# Patient Record
Sex: FEMALE | Race: ASIAN | NOT HISPANIC OR LATINO | URBAN - METROPOLITAN AREA
[De-identification: names, ages, dates, MRNs, and addresses within clinical notes are randomized per-mention and may not be internally consistent; named-entity substitution may affect disease eponyms.]

---

## 2023-08-08 ENCOUNTER — INPATIENT (INPATIENT)
Facility: HOSPITAL | Age: 88
LOS: 2 days | Discharge: HOME HEALTH | DRG: 291 | End: 2023-08-11
Attending: STUDENT IN AN ORGANIZED HEALTH CARE EDUCATION/TRAINING PROGRAM | Admitting: STUDENT IN AN ORGANIZED HEALTH CARE EDUCATION/TRAINING PROGRAM
Payer: MEDICARE

## 2023-08-08 VITALS
WEIGHT: 106.04 LBS | TEMPERATURE: 98 F | OXYGEN SATURATION: 92 % | HEART RATE: 116 BPM | RESPIRATION RATE: 30 BRPM | DIASTOLIC BLOOD PRESSURE: 105 MMHG | SYSTOLIC BLOOD PRESSURE: 161 MMHG

## 2023-08-08 DIAGNOSIS — I50.9 HEART FAILURE, UNSPECIFIED: ICD-10-CM

## 2023-08-08 DIAGNOSIS — Z95.2 PRESENCE OF PROSTHETIC HEART VALVE: Chronic | ICD-10-CM

## 2023-08-08 DIAGNOSIS — Z95.0 PRESENCE OF CARDIAC PACEMAKER: ICD-10-CM

## 2023-08-08 DIAGNOSIS — Z95.0 PRESENCE OF CARDIAC PACEMAKER: Chronic | ICD-10-CM

## 2023-08-08 DIAGNOSIS — Z78.9 OTHER SPECIFIED HEALTH STATUS: ICD-10-CM

## 2023-08-08 DIAGNOSIS — Z96.649 PRESENCE OF UNSPECIFIED ARTIFICIAL HIP JOINT: Chronic | ICD-10-CM

## 2023-08-08 DIAGNOSIS — I10 ESSENTIAL (PRIMARY) HYPERTENSION: ICD-10-CM

## 2023-08-08 DIAGNOSIS — I48.0 PAROXYSMAL ATRIAL FIBRILLATION: ICD-10-CM

## 2023-08-08 DIAGNOSIS — Z86.79 PERSONAL HISTORY OF OTHER DISEASES OF THE CIRCULATORY SYSTEM: ICD-10-CM

## 2023-08-08 LAB
ALBUMIN SERPL ELPH-MCNC: 4 G/DL — SIGNIFICANT CHANGE UP (ref 3.3–5)
ALP SERPL-CCNC: 140 U/L — HIGH (ref 40–120)
ALT FLD-CCNC: 82 U/L — HIGH (ref 10–45)
ANION GAP SERPL CALC-SCNC: 11 MMOL/L — SIGNIFICANT CHANGE UP (ref 5–17)
APTT BLD: 41.3 SEC — HIGH (ref 24.5–35.6)
AST SERPL-CCNC: 100 U/L — HIGH (ref 10–40)
BASOPHILS # BLD AUTO: 0.05 K/UL — SIGNIFICANT CHANGE UP (ref 0–0.2)
BASOPHILS NFR BLD AUTO: 0.8 % — SIGNIFICANT CHANGE UP (ref 0–2)
BILIRUB SERPL-MCNC: 0.8 MG/DL — SIGNIFICANT CHANGE UP (ref 0.2–1.2)
BUN SERPL-MCNC: 22 MG/DL — SIGNIFICANT CHANGE UP (ref 7–23)
CALCIUM SERPL-MCNC: 9.2 MG/DL — SIGNIFICANT CHANGE UP (ref 8.4–10.5)
CHLORIDE SERPL-SCNC: 92 MMOL/L — LOW (ref 96–108)
CO2 SERPL-SCNC: 23 MMOL/L — SIGNIFICANT CHANGE UP (ref 22–31)
CREAT SERPL-MCNC: 0.99 MG/DL — SIGNIFICANT CHANGE UP (ref 0.5–1.3)
EGFR: 53 ML/MIN/1.73M2 — LOW
EOSINOPHIL # BLD AUTO: 0.09 K/UL — SIGNIFICANT CHANGE UP (ref 0–0.5)
EOSINOPHIL NFR BLD AUTO: 1.4 % — SIGNIFICANT CHANGE UP (ref 0–6)
GLUCOSE SERPL-MCNC: 153 MG/DL — HIGH (ref 70–99)
HCT VFR BLD CALC: 37.9 % — SIGNIFICANT CHANGE UP (ref 34.5–45)
HGB BLD-MCNC: 12.7 G/DL — SIGNIFICANT CHANGE UP (ref 11.5–15.5)
IMM GRANULOCYTES NFR BLD AUTO: 0.2 % — SIGNIFICANT CHANGE UP (ref 0–0.9)
INR BLD: 1.32 — HIGH (ref 0.85–1.18)
LYMPHOCYTES # BLD AUTO: 1.3 K/UL — SIGNIFICANT CHANGE UP (ref 1–3.3)
LYMPHOCYTES # BLD AUTO: 19.5 % — SIGNIFICANT CHANGE UP (ref 13–44)
MAGNESIUM SERPL-MCNC: 1.8 MG/DL — SIGNIFICANT CHANGE UP (ref 1.6–2.6)
MCHC RBC-ENTMCNC: 30.5 PG — SIGNIFICANT CHANGE UP (ref 27–34)
MCHC RBC-ENTMCNC: 33.5 GM/DL — SIGNIFICANT CHANGE UP (ref 32–36)
MCV RBC AUTO: 90.9 FL — SIGNIFICANT CHANGE UP (ref 80–100)
MONOCYTES # BLD AUTO: 0.68 K/UL — SIGNIFICANT CHANGE UP (ref 0–0.9)
MONOCYTES NFR BLD AUTO: 10.2 % — SIGNIFICANT CHANGE UP (ref 2–14)
NEUTROPHILS # BLD AUTO: 4.53 K/UL — SIGNIFICANT CHANGE UP (ref 1.8–7.4)
NEUTROPHILS NFR BLD AUTO: 67.9 % — SIGNIFICANT CHANGE UP (ref 43–77)
NRBC # BLD: 0 /100 WBCS — SIGNIFICANT CHANGE UP (ref 0–0)
NT-PROBNP SERPL-SCNC: 9310 PG/ML — HIGH (ref 0–300)
PLATELET # BLD AUTO: 147 K/UL — LOW (ref 150–400)
POTASSIUM SERPL-MCNC: 4.4 MMOL/L — SIGNIFICANT CHANGE UP (ref 3.5–5.3)
POTASSIUM SERPL-SCNC: 4.4 MMOL/L — SIGNIFICANT CHANGE UP (ref 3.5–5.3)
PROT SERPL-MCNC: 7 G/DL — SIGNIFICANT CHANGE UP (ref 6–8.3)
PROTHROM AB SERPL-ACNC: 14.9 SEC — HIGH (ref 9.5–13)
RBC # BLD: 4.17 M/UL — SIGNIFICANT CHANGE UP (ref 3.8–5.2)
RBC # FLD: 13.3 % — SIGNIFICANT CHANGE UP (ref 10.3–14.5)
SODIUM SERPL-SCNC: 126 MMOL/L — LOW (ref 135–145)
TROPONIN T, HIGH SENSITIVITY RESULT: 27 NG/L — SIGNIFICANT CHANGE UP (ref 0–51)
WBC # BLD: 6.66 K/UL — SIGNIFICANT CHANGE UP (ref 3.8–10.5)
WBC # FLD AUTO: 6.66 K/UL — SIGNIFICANT CHANGE UP (ref 3.8–10.5)

## 2023-08-08 PROCEDURE — 71045 X-RAY EXAM CHEST 1 VIEW: CPT | Mod: 26

## 2023-08-08 PROCEDURE — 99285 EMERGENCY DEPT VISIT HI MDM: CPT

## 2023-08-08 RX ORDER — SACUBITRIL AND VALSARTAN 24; 26 MG/1; MG/1
1 TABLET, FILM COATED ORAL
Refills: 0 | Status: DISCONTINUED | OUTPATIENT
Start: 2023-08-08 | End: 2023-08-09

## 2023-08-08 RX ORDER — METOPROLOL TARTRATE 50 MG
25 TABLET ORAL DAILY
Refills: 0 | Status: DISCONTINUED | OUTPATIENT
Start: 2023-08-08 | End: 2023-08-09

## 2023-08-08 RX ORDER — FUROSEMIDE 40 MG
40 TABLET ORAL ONCE
Refills: 0 | Status: COMPLETED | OUTPATIENT
Start: 2023-08-08 | End: 2023-08-08

## 2023-08-08 RX ORDER — APIXABAN 2.5 MG/1
2.5 TABLET, FILM COATED ORAL EVERY 12 HOURS
Refills: 0 | Status: DISCONTINUED | OUTPATIENT
Start: 2023-08-08 | End: 2023-08-11

## 2023-08-08 RX ORDER — IPRATROPIUM BROMIDE 0.2 MG/ML
500 SOLUTION, NON-ORAL INHALATION EVERY 6 HOURS
Refills: 0 | Status: DISCONTINUED | OUTPATIENT
Start: 2023-08-08 | End: 2023-08-11

## 2023-08-08 RX ORDER — FUROSEMIDE 40 MG
40 TABLET ORAL
Refills: 0 | Status: DISCONTINUED | OUTPATIENT
Start: 2023-08-08 | End: 2023-08-10

## 2023-08-08 RX ADMIN — Medication 25 MILLIGRAM(S): at 22:41

## 2023-08-08 RX ADMIN — SACUBITRIL AND VALSARTAN 1 TABLET(S): 24; 26 TABLET, FILM COATED ORAL at 22:41

## 2023-08-08 RX ADMIN — Medication 40 MILLIGRAM(S): at 20:56

## 2023-08-08 NOTE — ED ADULT NURSE NOTE - NSFALLRISKINTERV_ED_ALL_ED

## 2023-08-08 NOTE — H&P ADULT - ASSESSMENT
89 y/o Cantonese speaking female, ambulates with walker, has HHA 5 days a week 6hrs a day with PMHx of HTN, pAtrial Fibrillation (on Eliquis 2.5mg), recently diagnosed with CHF (EF unknown), hx of PPM (2yrs ago replaced @[facility]), and hx of valve repair/replacement 15yrs ago @[facility] (daughter does not recall which valve) presents to [facility] ER this evening, 8/8/23, accompanied by daughter, with PEREZ, b/l UE LE swelling X2 .

## 2023-08-08 NOTE — H&P ADULT - REASON FOR ADMISSION
Zyloprim     Last Written Prescription Date:  6-25-18  Last Fill Quantity: 90,   # refills: 0  Last Office Visit : 6-26-18  Future Office visit:  12-17-18    Routing refill request to provider for review/approval because:  Drug failed the [facility], [facility] or [facility] refill protocol.  Abnormal lab   6-26-18  Creatinine 1.51 (H)     Lab Test  03/22/18   1229   WBC  11.8*   RBC  4.04*   HGB  13.0*   HCT  37.5*   PLT  193     90 day rx pending      
Acute on Chronic CHF Exacerbation (EF unknown)

## 2023-08-08 NOTE — H&P ADULT - NSHPADDITIONALINFOADULT_GEN_ALL_CORE
Attending Attestation:  Patient seen and examined at bedside on 8/9. Please see attending addendum on that progress note.

## 2023-08-08 NOTE — ED PROVIDER NOTE - NSICDXNOPASTSURGICALHX_GEN_ALL_ED
<-- Click to add NO significant Past Surgical History REVIEW OF SYSTEMS:    CONSTITUTIONAL: No fever, weight loss, chills, shakes, or fatigue  EYES: No eye pain, visual disturbances, or discharge  ENMT:  No difficulty hearing, tinnitus, vertigo; No sinus or throat pain  NECK: No pain or stiffness  RESPIRATORY: No cough, wheezing, hemoptysis, or shortness of breath  CARDIOVASCULAR: No chest pain, dyspnea, palpitations, dizziness, syncope, paroxysmal nocturnal dyspnea, orthopnea, or arm or leg swelling  GASTROINTESTINAL: No abdominal  or epigastric pain, nausea, vomiting, hematemesis, diarrhea, constipation, melena or bright red blood.  GENITOURINARY: No dysuria, nocturia, hematuria, or urinary incontinence  NEUROLOGICAL: No headaches, memory loss, slurred speech, limb weakness, loss of strength, numbness, or tremors  SKIN: No itching, burning, rashes, or lesions   MUSCULOSKELETAL: No joint pain or swelling, muscle, back, or extremity pain  PSYCHIATRIC: No depression, anxiety, or difficulty sleeping

## 2023-08-08 NOTE — ED PROVIDER NOTE - CLINICAL SUMMARY MEDICAL DECISION MAKING FREE TEXT BOX
acute chf. unclear if new diagnosis from history but recently started on lasix 40mg. took 2 doses today. no chest pain to suggest acs. no fever to suggest infection. labs/cxr/ekg ordered. bnp 9000+. cxr with congestion/ cardiomegaly. discussed with cards fellow, requested additional lasix 40mg iv. admit to tele for further management

## 2023-08-08 NOTE — ED PROVIDER NOTE - WR INTERPRETATION 1
CXR - No pneumothorax, No opacities, No free air. congestion, cardiomegaly, sternal wires, pacemaker

## 2023-08-08 NOTE — H&P ADULT - HISTORY OF PRESENT ILLNESS
A&O X3  Full Code   Daughter Ileana Sapp, reliable source, lives with mother, contributed to history A&O X3  Full Code   Daughter Ileana Sapp, reliable source, lives with mother, contributed to history  Pt. refuses   service     89 y/o Cantonese speaking female, ambulates with walker, has HHA 5 days a week 6hrs a day with PMHx of HTN, pAtrial Fibrillation (on Eliquis 2.5mg), recently diagnosed with CHF (EF unknown), hx of PPM (2yrs ago replaced @[facility]), and hx of valve repair/replacement 15yrs ago @[facility] (daughter does not recall which valve) presents to [facility] ER this evening, 8/8/23, accompanied by daughter, with PEREZ, b/l UE LE swelling X2 .    In speaking with daughter, she reports her mother has been experiencing worsening PEREZ for one month and was recently diagnosed with CHF, prescribed Lasix 40 daily and Entresto 24-26mg bid.    Daughter states despite taking her medication her symptoms have worsened in the last two days and her  mother's Cardiologist recommended her to go to [facility] ER for further evaluation.     In ER ECG reveals Atrial Flutter  bpm with non specific ST-T wave changes, Troponin T Sensitivity neg X1 27,  BNP 9310, Na 126, Alk Phos 140, , ALT 82 Blood Glucose 153.  CXR AP reveals pulmonary congestion (follow up official report)  VSS Temp 96.8F HR Atrial Fib 104, /107   O2 2L NC 95%.    Patient is admitted to [facility] for acute on chronic CHF exacerbation, EF unknown.  Continue Core Measures, diuresis Lasix 40mg IV bid,  TTE in AM.

## 2023-08-08 NOTE — H&P ADULT - NSICDXPASTMEDICALHX_GEN_ALL_CORE_FT
PAST MEDICAL HISTORY:  Acute CHF     Atrial fibrillation     Cardiac pacemaker     HTN (hypertension)

## 2023-08-08 NOTE — H&P ADULT - PROBLEM SELECTOR PLAN 1
Recently diagnosed with CHF prescribed Lasix 40mg daily; Entresto 24-26mg bid (EF unknown)  BNP 9310  Lasix 40mg IV bid  on home Metoprolol XL 50mg daily; continue Metoprolol XL25mg daily   core measure (daily weight and strict I&O q4hrs)  TTE in AM  discuss further cardiac workup  GDMT

## 2023-08-08 NOTE — ED ADULT NURSE NOTE - CHPI ED NUR SYMPTOMS NEG
no back pain/no chest pain/no chills/no congestion/no diaphoresis/no dizziness/no fever/no nausea/no syncope

## 2023-08-08 NOTE — PATIENT PROFILE ADULT - FALL HARM RISK - HARM RISK INTERVENTIONS

## 2023-08-08 NOTE — H&P ADULT - NSHPLABSRESULTS_GEN_ALL_CORE
12.7   6.66  )-----------( 147      ( 08 Aug 2023 19:22 )             37.9       08-08    126<L>  |  92<L>  |  22  ----------------------------<  153<H>  4.4   |  23  |  0.99    Ca    9.2      08 Aug 2023 19:22  Mg     1.8     08-08    TPro  7.0  /  Alb  4.0  /  TBili  0.8  /  DBili  x   /  AST  100<H>  /  ALT  82<H>  /  AlkPhos  140<H>  08-08      PT/INR - ( 08 Aug 2023 19:22 )   PT: 14.9 sec;   INR: 1.32          PTT - ( 08 Aug 2023 19:22 )  PTT:41.3 sec          Urinalysis Basic - ( 08 Aug 2023 19:22 )    Color: x / Appearance: x / SG: x / pH: x  Gluc: 153 mg/dL / Ketone: x  / Bili: x / Urobili: x   Blood: x / Protein: x / Nitrite: x   Leuk Esterase: x / RBC: x / WBC x   Sq Epi: x / Non Sq Epi: x / Bacteria: x        EKG: Atrial Flutter HR 123bpm with non specific ST-T wave changs Isotretinoin Pregnancy And Lactation Text: This medication is Pregnancy Category X and is considered extremely dangerous during pregnancy. It is unknown if it is excreted in breast milk.

## 2023-08-08 NOTE — ED PROVIDER NOTE - OBJECTIVE STATEMENT
sincere via daughter per pt request: history of afib on eliquis/ metoprolol, htn, pacemaker, recently diagnosed with chf, started lasix 40mg daily last week. Took for a few days, then stopped, started again today (took twice). Notes increased swelling of arms/legs, trouble breathing past week. Trouble sleeping last night due to symptoms. Denies chest pain, fever. Sent by her cardiologist for further evaluation.

## 2023-08-08 NOTE — H&P ADULT - NSHPREVIEWOFSYSTEMS_GEN_ALL_CORE
GENERAL, CONSTITUTIONAL : denies recent weight loss, fever, chills  EYES, VISION: denies changes in vision   EARS, NOSE, THROAT: denies hearing loss  HEART, CARDIOVASCULAR: denies chest pain, arrhythmia, palpitations, SOB,  LE edema, claudication  RESPIRATORY: Denies cough, SOB, wheezing, PND, orthopnea  GASTROINTESTINAL: Denies abdominal pain, heartburn, bloody stool, dark tarry stool  GENITOURINARY: Denies frequent urination, urgency  MUSCULOSKELETAL denies joint pain or swelling, restricted motion, musculoskeletal pain.   SKIN & INTEGUMENTARY Denies rashes, sores, blisters, blisters, growths.  NEUROLOGICAL: Denies numbness or tingling sensations, sensation loss, burning.   PSYCHIATRIC: Denies nervousness, anxiety, depression  ENDOCRINE Denies heat or cold intolerance, excessive thirst  HEMATOLOGIC/LYMPHATIC: Denies abnormal bleeding, bleeding of any kind GENERAL, CONSTITUTIONAL : denies recent weight loss, fever, chills  EYES, VISION: denies changes in vision   EARS, NOSE, THROAT: denies hearing loss  HEART, CARDIOVASCULAR: denies chest pain, arrhythmia, palpitations,   RESPIRATORY: admits   SOB, crackles, orthopnea, denies wheezing, PND,   GASTROINTESTINAL: Denies abdominal pain, heartburn, bloody stool, dark tarry stool  GENITOURINARY: Denies frequent urination, urgency  MUSCULOSKELETAL admits to  joint pain  restricted motion, musculoskeletal pain.   SKIN & INTEGUMENTARY Denies rashes, sores, blisters, blisters, growths.  NEUROLOGICAL: Denies numbness or tingling sensations, sensation loss, burning.   PSYCHIATRIC: Denies nervousness, anxiety, depression  ENDOCRINE Denies heat or cold intolerance, excessive thirst  HEMATOLOGIC/LYMPHATIC: Denies abnormal bleeding, bleeding of any kind

## 2023-08-08 NOTE — H&P ADULT - PROBLEM SELECTOR PLAN 2
ECG Atrial Flutter HR 123bpm with non specific ST-T wave changes  now  Atrial Fibrillation rate control   continue Metoprolol XL 25mg daily (on Metoprolol XL 50mg daily)  continue Eliquis 2.5mg bid

## 2023-08-09 DIAGNOSIS — I34.0 NONRHEUMATIC MITRAL (VALVE) INSUFFICIENCY: ICD-10-CM

## 2023-08-09 DIAGNOSIS — R74.01 ELEVATION OF LEVELS OF LIVER TRANSAMINASE LEVELS: ICD-10-CM

## 2023-08-09 DIAGNOSIS — I50.21 ACUTE SYSTOLIC (CONGESTIVE) HEART FAILURE: ICD-10-CM

## 2023-08-09 LAB
A1C WITH ESTIMATED AVERAGE GLUCOSE RESULT: 6 % — HIGH (ref 4–5.6)
ALBUMIN SERPL ELPH-MCNC: 3.4 G/DL — SIGNIFICANT CHANGE UP (ref 3.3–5)
ALP SERPL-CCNC: 118 U/L — SIGNIFICANT CHANGE UP (ref 40–120)
ALT FLD-CCNC: 61 U/L — HIGH (ref 10–45)
ANION GAP SERPL CALC-SCNC: 11 MMOL/L — SIGNIFICANT CHANGE UP (ref 5–17)
APTT BLD: 41.8 SEC — HIGH (ref 24.5–35.6)
AST SERPL-CCNC: 58 U/L — HIGH (ref 10–40)
BASOPHILS # BLD AUTO: 0.05 K/UL — SIGNIFICANT CHANGE UP (ref 0–0.2)
BASOPHILS NFR BLD AUTO: 0.8 % — SIGNIFICANT CHANGE UP (ref 0–2)
BILIRUB DIRECT SERPL-MCNC: 0.3 MG/DL — SIGNIFICANT CHANGE UP (ref 0–0.3)
BILIRUB INDIRECT FLD-MCNC: 0.7 MG/DL — SIGNIFICANT CHANGE UP (ref 0.2–1)
BILIRUB SERPL-MCNC: 0.9 MG/DL — SIGNIFICANT CHANGE UP (ref 0.2–1.2)
BUN SERPL-MCNC: 23 MG/DL — SIGNIFICANT CHANGE UP (ref 7–23)
CALCIUM SERPL-MCNC: 8.4 MG/DL — SIGNIFICANT CHANGE UP (ref 8.4–10.5)
CHLORIDE SERPL-SCNC: 93 MMOL/L — LOW (ref 96–108)
CHOLEST SERPL-MCNC: 140 MG/DL — SIGNIFICANT CHANGE UP
CO2 SERPL-SCNC: 26 MMOL/L — SIGNIFICANT CHANGE UP (ref 22–31)
CREAT ?TM UR-MCNC: 13 MG/DL — SIGNIFICANT CHANGE UP
CREAT SERPL-MCNC: 0.95 MG/DL — SIGNIFICANT CHANGE UP (ref 0.5–1.3)
EGFR: 56 ML/MIN/1.73M2 — LOW
EOSINOPHIL # BLD AUTO: 0.13 K/UL — SIGNIFICANT CHANGE UP (ref 0–0.5)
EOSINOPHIL NFR BLD AUTO: 2 % — SIGNIFICANT CHANGE UP (ref 0–6)
ESTIMATED AVERAGE GLUCOSE: 126 MG/DL — HIGH (ref 68–114)
GLUCOSE SERPL-MCNC: 108 MG/DL — HIGH (ref 70–99)
HCT VFR BLD CALC: 35.9 % — SIGNIFICANT CHANGE UP (ref 34.5–45)
HDLC SERPL-MCNC: 64 MG/DL — SIGNIFICANT CHANGE UP
HGB BLD-MCNC: 11.8 G/DL — SIGNIFICANT CHANGE UP (ref 11.5–15.5)
IMM GRANULOCYTES NFR BLD AUTO: 0.3 % — SIGNIFICANT CHANGE UP (ref 0–0.9)
INR BLD: 1.34 — HIGH (ref 0.85–1.18)
LACTATE SERPL-SCNC: 1.2 MMOL/L — SIGNIFICANT CHANGE UP (ref 0.5–2)
LIPID PNL WITH DIRECT LDL SERPL: 65 MG/DL — SIGNIFICANT CHANGE UP
LYMPHOCYTES # BLD AUTO: 0.99 K/UL — LOW (ref 1–3.3)
LYMPHOCYTES # BLD AUTO: 15.3 % — SIGNIFICANT CHANGE UP (ref 13–44)
MAGNESIUM SERPL-MCNC: 1.8 MG/DL — SIGNIFICANT CHANGE UP (ref 1.6–2.6)
MCHC RBC-ENTMCNC: 30 PG — SIGNIFICANT CHANGE UP (ref 27–34)
MCHC RBC-ENTMCNC: 32.9 GM/DL — SIGNIFICANT CHANGE UP (ref 32–36)
MCV RBC AUTO: 91.3 FL — SIGNIFICANT CHANGE UP (ref 80–100)
MONOCYTES # BLD AUTO: 0.75 K/UL — SIGNIFICANT CHANGE UP (ref 0–0.9)
MONOCYTES NFR BLD AUTO: 11.6 % — SIGNIFICANT CHANGE UP (ref 2–14)
NEUTROPHILS # BLD AUTO: 4.54 K/UL — SIGNIFICANT CHANGE UP (ref 1.8–7.4)
NEUTROPHILS NFR BLD AUTO: 70 % — SIGNIFICANT CHANGE UP (ref 43–77)
NON HDL CHOLESTEROL: 76 MG/DL — SIGNIFICANT CHANGE UP
NRBC # BLD: 0 /100 WBCS — SIGNIFICANT CHANGE UP (ref 0–0)
OSMOLALITY UR: 255 MOSM/KG — LOW (ref 300–900)
PHOSPHATE SERPL-MCNC: 4.9 MG/DL — HIGH (ref 2.5–4.5)
PLATELET # BLD AUTO: 119 K/UL — LOW (ref 150–400)
POTASSIUM SERPL-MCNC: 4.1 MMOL/L — SIGNIFICANT CHANGE UP (ref 3.5–5.3)
POTASSIUM SERPL-SCNC: 4.1 MMOL/L — SIGNIFICANT CHANGE UP (ref 3.5–5.3)
PROT SERPL-MCNC: 6.2 G/DL — SIGNIFICANT CHANGE UP (ref 6–8.3)
PROTHROM AB SERPL-ACNC: 15.1 SEC — HIGH (ref 9.5–13)
RBC # BLD: 3.93 M/UL — SIGNIFICANT CHANGE UP (ref 3.8–5.2)
RBC # FLD: 13.3 % — SIGNIFICANT CHANGE UP (ref 10.3–14.5)
SODIUM SERPL-SCNC: 130 MMOL/L — LOW (ref 135–145)
SODIUM UR-SCNC: 90 MMOL/L — SIGNIFICANT CHANGE UP
TRIGL SERPL-MCNC: 54 MG/DL — SIGNIFICANT CHANGE UP
TSH SERPL-MCNC: 1.25 UIU/ML — SIGNIFICANT CHANGE UP (ref 0.27–4.2)
UUN UR-MCNC: 132 MG/DL — SIGNIFICANT CHANGE UP
WBC # BLD: 6.48 K/UL — SIGNIFICANT CHANGE UP (ref 3.8–10.5)
WBC # FLD AUTO: 6.48 K/UL — SIGNIFICANT CHANGE UP (ref 3.8–10.5)

## 2023-08-09 PROCEDURE — 99223 1ST HOSP IP/OBS HIGH 75: CPT | Mod: 25

## 2023-08-09 PROCEDURE — 99222 1ST HOSP IP/OBS MODERATE 55: CPT

## 2023-08-09 PROCEDURE — 93280 PM DEVICE PROGR EVAL DUAL: CPT | Mod: 26

## 2023-08-09 PROCEDURE — 93306 TTE W/DOPPLER COMPLETE: CPT | Mod: 26

## 2023-08-09 PROCEDURE — 99223 1ST HOSP IP/OBS HIGH 75: CPT

## 2023-08-09 RX ORDER — METOPROLOL TARTRATE 50 MG
25 TABLET ORAL ONCE
Refills: 0 | Status: COMPLETED | OUTPATIENT
Start: 2023-08-09 | End: 2023-08-09

## 2023-08-09 RX ORDER — SENNA PLUS 8.6 MG/1
2 TABLET ORAL AT BEDTIME
Refills: 0 | Status: DISCONTINUED | OUTPATIENT
Start: 2023-08-09 | End: 2023-08-11

## 2023-08-09 RX ORDER — MAGNESIUM OXIDE 400 MG ORAL TABLET 241.3 MG
400 TABLET ORAL ONCE
Refills: 0 | Status: COMPLETED | OUTPATIENT
Start: 2023-08-09 | End: 2023-08-09

## 2023-08-09 RX ORDER — SACUBITRIL AND VALSARTAN 24; 26 MG/1; MG/1
1 TABLET, FILM COATED ORAL
Refills: 0 | Status: DISCONTINUED | OUTPATIENT
Start: 2023-08-09 | End: 2023-08-11

## 2023-08-09 RX ORDER — METOPROLOL TARTRATE 50 MG
50 TABLET ORAL DAILY
Refills: 0 | Status: DISCONTINUED | OUTPATIENT
Start: 2023-08-10 | End: 2023-08-11

## 2023-08-09 RX ORDER — POLYETHYLENE GLYCOL 3350 17 G/17G
17 POWDER, FOR SOLUTION ORAL DAILY
Refills: 0 | Status: DISCONTINUED | OUTPATIENT
Start: 2023-08-09 | End: 2023-08-11

## 2023-08-09 RX ADMIN — Medication 500 MICROGRAM(S): at 22:07

## 2023-08-09 RX ADMIN — Medication 25 MILLIGRAM(S): at 05:34

## 2023-08-09 RX ADMIN — APIXABAN 2.5 MILLIGRAM(S): 2.5 TABLET, FILM COATED ORAL at 05:34

## 2023-08-09 RX ADMIN — Medication 25 MILLIGRAM(S): at 17:10

## 2023-08-09 RX ADMIN — MAGNESIUM OXIDE 400 MG ORAL TABLET 400 MILLIGRAM(S): 241.3 TABLET ORAL at 05:34

## 2023-08-09 RX ADMIN — SENNA PLUS 2 TABLET(S): 8.6 TABLET ORAL at 22:02

## 2023-08-09 RX ADMIN — Medication 40 MILLIGRAM(S): at 14:04

## 2023-08-09 RX ADMIN — POLYETHYLENE GLYCOL 3350 17 GRAM(S): 17 POWDER, FOR SOLUTION ORAL at 14:04

## 2023-08-09 RX ADMIN — APIXABAN 2.5 MILLIGRAM(S): 2.5 TABLET, FILM COATED ORAL at 17:10

## 2023-08-09 RX ADMIN — SACUBITRIL AND VALSARTAN 1 TABLET(S): 24; 26 TABLET, FILM COATED ORAL at 17:09

## 2023-08-09 RX ADMIN — MAGNESIUM OXIDE 400 MG ORAL TABLET 400 MILLIGRAM(S): 241.3 TABLET ORAL at 14:04

## 2023-08-09 RX ADMIN — SACUBITRIL AND VALSARTAN 1 TABLET(S): 24; 26 TABLET, FILM COATED ORAL at 05:34

## 2023-08-09 RX ADMIN — Medication 40 MILLIGRAM(S): at 05:35

## 2023-08-09 NOTE — CONSULT NOTE ADULT - CONSULT REASON
Group Therapy Note    Date: 10/8/2020    Group Start Time: 1000  Group End Time: 6358  Group Topic: Psychoeducation    GIAN Diaz, CTRS        Group Therapy Note    Attendees: 8/16      Pt did not attend RT skills group d/t resting in room despite staff invitation to attend. 1:1 talk time offered as alternative to group session, pt declined.
co-management
AF/ pacemaker check

## 2023-08-09 NOTE — PROGRESS NOTE ADULT - PROBLEM SELECTOR PLAN 2
ECG Atrial Flutter HR 123bpm with non specific ST-T wave changes  now  Atrial Fibrillation rate control   continue Metoprolol XL 25mg daily (on Metoprolol XL 50mg daily)  continue Eliquis 2.5mg bid Presented in Afib 120s, currently Afib 80-120s on tele.  -EP interrogated [employer] PPM, noting increased AF burden. Most recent episode ongoing since 7/29. Overall AF burden since last interrogation (July 2022) 13%.   -c/w Toprol 25mg daily, will add Lopressor 25mg x1 this evening. Resume home dose Toprol 50mg qd in AM  -c/w Eliquis 2.5mg BID (age 89, Wt 53kg), initiated 7/27 by Dr Lopez  -Consider possible MISAEL/DCCV prior to discharge. Family request to be discussed w/ Dr Lopez Presented in Afib 120s, currently Afib 80-120s on tele.  -EP interrogated [employer] PPM, noting increased AF burden. Most recent episode ongoing since 7/29. Overall AF burden since last interrogation (July 2022) 13%.   -c/w Toprol 25mg daily, will add Lopressor 25mg x1 this evening. Resume home dose Toprol 50mg qd in AM  -c/w Eliquis 2.5mg BID (age 89, Wt 53kg), initiated by Dr Lopez on 7/27  -Consider possible MISAEL/DCCV prior to discharge. Family request to be discussed w/ Dr Lopez

## 2023-08-09 NOTE — PROGRESS NOTE ADULT - PROBLEM SELECTOR PLAN 5
Please call patient's Cardiologist, Dr. 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

## 2023-08-09 NOTE — PROGRESS NOTE ADULT - ASSESSMENT
89 y/o Cantonese speaking female, ambulates with walker, has HHA 5 days a week 6hrs a day with PMHx of HTN, pAtrial Fibrillation (on Eliquis 2.5mg), recently diagnosed with CHF (EF unknown), hx of PPM (2yrs ago replaced @[facility]), and hx of valve repair/replacement 15yrs ago @[facility] (daughter does not recall which valve) presents to [facility] ER this evening, 8/8/23, accompanied by daughter, with PEREZ, b/l UE LE swelling X2 .   89 y/o Cantonese speaking female, ambulates with walker, has HHA 5 days a week 6hrs a day with PMHx of HTN, pAtrial Fibrillation (on Eliquis 2.5mg), recently diagnosed with CHF (EF unknown), hx of PPM (2yrs ago replaced @[facility]), and hx of valve repair/replacement 15yrs ago @[facility] (daughter does not recall which valve) presents to [facility] ER this evening, 8/8/23, accompanied by daughter, with PEREZ, b/l UE LE swelling X2 . 89 y/o Cantonese speaking female, ambulates with walker, has HHA 5 days a week 6hr/day, with PMHx of HTN, paroxysmal Afib (recently started on Eliquis 2.5mg on 7/27), recently diagnosed with HFrEF (EF 40%), [employer] PPM (gen change 2021 @[facility]), bioprosthetic AVR 2009 @[facility], presents accompanied by daughter c/o PEREZ and b/l UE/LE swelling x 1 week. Admitted to cardiac tele for acute on chronic HFrEF exacerbation, currently on IV diuresis. EP following, PPM interrogation revealed persistent Afib since 7/29.  89 y/o Cantonese speaking female, ambulates with walker, has HHA 5 days a week 6hr/day, with PMHx of HTN, paroxysmal Afib (recently started on Eliquis 2.5mg on 7/27), recently diagnosed with HFrEF (EF 40%), [employer] PPM (s/p gen change 2022 @[facility]), bioprosthetic AVR 2009 @[facility], presents accompanied by daughter c/o PEREZ and b/l UE/LE swelling x 1 week. Admitted to cardiac tele for acute on chronic HFrEF exacerbation, currently on IV diuresis. EP following, PPM interrogation revealed persistent Afib since 7/29, pending possible MISAEL/DCCV.

## 2023-08-09 NOTE — CONSULT NOTE ADULT - SUBJECTIVE AND OBJECTIVE BOX
HPI:    89 year old cantonese speaking female with history of HTN, history of Medtronic dual chamber pacemaker (unclear initial indication) initially implanted in 2012 s/p gen change 2022, history of aortic bioprosthetic         PAST MEDICAL & SURGICAL HISTORY:  Atrial fibrillation  HTN (hypertension)  Acute CHF  Cardiac pacemaker  Heart valve replaced  S/P hip replacement  Cardiac pacemaker              Social History:no smoking, no drugs, no algohol    pertinent home medications:    Inpatient Medications:   apixaban 2.5 milliGRAM(s) Oral every 12 hours  furosemide   Injectable 40 milliGRAM(s) IV Push two times a day  ipratropium    for Nebulization 500 MICROGram(s) Nebulizer every 6 hours PRN  LORazepam     Tablet 0.5 milliGRAM(s) Oral two times a day PRN  metoprolol succinate ER 25 milliGRAM(s) Oral daily  polyethylene glycol 3350 17 Gram(s) Oral daily  sacubitril 24 mG/valsartan 26 mG 1 Tablet(s) Oral two times a day  senna 2 Tablet(s) Oral at bedtime      Allergies: No Known Allergies      ROS:   CONSTITUTIONAL: No fever, weight loss + fatigue  EYES: Pt denies  RESPIRATORY: No cough, wheezing, chills or hemoptysis; No Shortness of Breath  CARDIOVASCULAR: see HPI  GASTROINTESTINAL: Pt denies  NEUROLOGICAL: Pt denies  SKIN: Pt denies   PSYCHIATRIC: Pt denies  HEME/LYMPH: Pt denies    PHYSICAL:  T(C): 36.4 (08-09-23 @ 08:39), Max: 36.8 (08-08-23 @ 18:17)  HR: 97 (08-09-23 @ 13:30) (76 - 116)  BP: 148/95 (08-09-23 @ 13:30) (132/83 - 169/107)  RR: 17 (08-09-23 @ 08:39) (17 - 30)  SpO2: 97% (08-09-23 @ 13:30) (92% - 100%)  Wt(kg): --  Appearance: No acute distress, well developed  Eyes: normal appearing conjunctiva, pupils and eyelids  Cardiovascular: Normal S1 S2, No JVD, No murmurs, No edema  Respiratory: Lungs clear to auscultation	bilaterally.  No wheeze, rhonchi, rales noted  Gastrointestinal:  Soft, NT/ND 	  Neurologic:  No deficit noted  Psych: A&Ox3, normal mood/affect  Musculoskeletal: normal gait  Skin: no rash noted, normal color and pigmentation.        LABS:                        11.8   6.48  )-----------( 119      ( 09 Aug 2023 05:30 )             35.9     08-09    130<L>  |  93<L>  |  23  ----------------------------<  108<H>  4.1   |  26  |  0.95    Ca    8.4      09 Aug 2023 05:30  Phos  4.9     08-09  Mg     1.8     08-09    TPro  6.2  /  Alb  3.4  /  TBili  0.9  /  DBili  0.3  /  AST  58<H>  /  ALT  61<H>  /  AlkPhos  118  08-09    PT/INR - ( 09 Aug 2023 05:30 )   PT: 15.1 sec;   INR: 1.34          PTT - ( 09 Aug 2023 05:30 )  PTT:41.8 sec  TSH  Troponin    EKG:    Telemetry:    ECHO:    Prior EP procedures:    Cath / stress / Cardiac CTa:    Assessment Plan:         HPI:    89 year old Cantonese speaking female with history of HTN, history of Medtronic dual chamber pacemaker (unclear initial indication) initially implanted in 2012 s/p gen change 2022, history of aortic bioprosthetic valve implanted in 2009, who presents with leg swelling and dypsnea on exertion for the past week. Pt saw cardiologist Dr. Lopez on 8/4, pt and daughter refused to go to hospital at that time, now presents with worsening respiratory symptoms and ADHF.     ECHO with EF 35-40%, biatrial enlargement, severe MR.     Pt has Medtronic pacemaker, interrogation as follows                                                                                Electrophysiology Device Interrogation     Device model: Medtronic Aydee Dual Chamber Pacemaker			                          Functioning Mode: DDD     Underlying Rhythm:      Pacemaker dependency:     Battery status:     Interrogating parameters:   				RA			RV			LV  Sense:                                                              Threshold:                                                                                                                    Pacing Impedance:                                                                                                          Shock Impedance:                                                                                                                     Events/Alert:      Parameter change: 	     Assessment:             PAST MEDICAL & SURGICAL HISTORY:  Atrial fibrillation  HTN (hypertension)  Acute CHF  Cardiac pacemaker  Heart valve replaced  S/P hip replacement  Cardiac pacemaker              Social History:no smoking, no drugs, no algohol    pertinent home medications:    Inpatient Medications:   apixaban 2.5 milliGRAM(s) Oral every 12 hours  furosemide   Injectable 40 milliGRAM(s) IV Push two times a day  ipratropium    for Nebulization 500 MICROGram(s) Nebulizer every 6 hours PRN  LORazepam     Tablet 0.5 milliGRAM(s) Oral two times a day PRN  metoprolol succinate ER 25 milliGRAM(s) Oral daily  polyethylene glycol 3350 17 Gram(s) Oral daily  sacubitril 24 mG/valsartan 26 mG 1 Tablet(s) Oral two times a day  senna 2 Tablet(s) Oral at bedtime      Allergies: No Known Allergies      ROS:   CONSTITUTIONAL: No fever, weight loss + fatigue  EYES: Pt denies  RESPIRATORY: No cough, wheezing, chills or hemoptysis; No Shortness of Breath  CARDIOVASCULAR: see HPI  GASTROINTESTINAL: Pt denies  NEUROLOGICAL: Pt denies  SKIN: Pt denies   PSYCHIATRIC: Pt denies  HEME/LYMPH: Pt denies    PHYSICAL:  T(C): 36.4 (08-09-23 @ 08:39), Max: 36.8 (08-08-23 @ 18:17)  HR: 97 (08-09-23 @ 13:30) (76 - 116)  BP: 148/95 (08-09-23 @ 13:30) (132/83 - 169/107)  RR: 17 (08-09-23 @ 08:39) (17 - 30)  SpO2: 97% (08-09-23 @ 13:30) (92% - 100%)  Wt(kg): --  Appearance: No acute distress, well developed  Eyes: normal appearing conjunctiva, pupils and eyelids  Cardiovascular: Normal S1 S2, No JVD, No murmurs, No edema  Respiratory: Lungs clear to auscultation	bilaterally.  No wheeze, rhonchi, rales noted  Gastrointestinal:  Soft, NT/ND 	  Neurologic:  No deficit noted  Psych: A&Ox3, normal mood/affect  Musculoskeletal: normal gait  Skin: no rash noted, normal color and pigmentation.        LABS:                        11.8   6.48  )-----------( 119      ( 09 Aug 2023 05:30 )             35.9     08-09    130<L>  |  93<L>  |  23  ----------------------------<  108<H>  4.1   |  26  |  0.95    Ca    8.4      09 Aug 2023 05:30  Phos  4.9     08-09  Mg     1.8     08-09    TPro  6.2  /  Alb  3.4  /  TBili  0.9  /  DBili  0.3  /  AST  58<H>  /  ALT  61<H>  /  AlkPhos  118  08-09    PT/INR - ( 09 Aug 2023 05:30 )   PT: 15.1 sec;   INR: 1.34          PTT - ( 09 Aug 2023 05:30 )  PTT:41.8 sec  TSH  Troponin    EKG:    Telemetry:    ECHO:    Prior EP procedures:    Cath / stress / Cardiac CTa:    Assessment Plan:         HPI:    89 year old Cantonese speaking female with history of HTN, history of Medtronic dual chamber pacemaker (unclear initial indication) initially implanted in 2012 s/p gen change 2022, history of aortic bioprosthetic valve implanted in 2009, who presents with leg swelling and dypsnea on exertion for the past week. Pt saw cardiologist Dr. Lopez on 8/4, pt and daughter refused to go to hospital at that time, now presents with worsening respiratory symptoms and ADHF.     ECHO with EF 35-40%, biatrial enlargement, severe MR.     Pt has Medtronic pacemaker, interrogation as follows                                                                                Electrophysiology Device Interrogation     Device model: Medtronic Aydee Dual Chamber Pacemaker			                          Functioning Mode: DDD 60 to 110     Underlying Rhythm:  AF with RVR    Pacemaker dependency: not pacemaker dependent. AP 22%  <1%    Battery status: 13 years remaining    Interrogating parameters:   				RA			RV		  Sense:                                     0.6mV                         12.8mV  Threshold:                unable to assess (in AF)           unable to assess (VR >105bpm) but threshold trends look stable.                                                                                    Pacing Impedance:                   475 ohms                      627 ohms                                                                  Events/Alert:  patient with increased AF burden. Most recent episode ongoing since July 29th. Overall AF burden since last interrogation (July 2022) 13%.     Parameter change: 	 none      PAST MEDICAL & SURGICAL HISTORY:  Atrial fibrillation  HTN (hypertension)  Acute CHF  Cardiac pacemaker  Heart valve replaced  S/P hip replacement  Cardiac pacemaker    Social History: no smoking, no drugs, no ETOH    pertinent home medications:    Inpatient Medications:   apixaban 2.5 milliGRAM(s) Oral every 12 hours  furosemide   Injectable 40 milliGRAM(s) IV Push two times a day  ipratropium    for Nebulization 500 MICROGram(s) Nebulizer every 6 hours PRN  LORazepam     Tablet 0.5 milliGRAM(s) Oral two times a day PRN  metoprolol succinate ER 25 milliGRAM(s) Oral daily  polyethylene glycol 3350 17 Gram(s) Oral daily  sacubitril 24 mG/valsartan 26 mG 1 Tablet(s) Oral two times a day  senna 2 Tablet(s) Oral at bedtime      Allergies: No Known Allergies      ROS:   CONSTITUTIONAL: No fever, weight loss + fatigue  EYES: Pt denies  RESPIRATORY: No cough, wheezing, chills or hemoptysis; No Shortness of Breath  CARDIOVASCULAR: see HPI  GASTROINTESTINAL: Pt denies  NEUROLOGICAL: Pt denies  SKIN: Pt denies   PSYCHIATRIC: Pt denies  HEME/LYMPH: Pt denies    PHYSICAL:  T(C): 36.4 (08-09-23 @ 08:39), Max: 36.8 (08-08-23 @ 18:17)  HR: 97 (08-09-23 @ 13:30) (76 - 116)  BP: 148/95 (08-09-23 @ 13:30) (132/83 - 169/107)  RR: 17 (08-09-23 @ 08:39) (17 - 30)  SpO2: 97% (08-09-23 @ 13:30) (92% - 100%)  Appearance: No acute distress, well developed  Eyes: normal appearing conjunctiva, pupils and eyelids  Cardiovascular: irregularly irregular   Respiratory: Lungs clear to auscultation	bilaterally.  No wheeze, rhonchi, rales noted  Gastrointestinal:  Soft, NT/ND 	  Neurologic:  No deficit noted  Psych: A&Ox3, normal mood/affect  Musculoskeletal: normal gait  Skin: no rash noted, normal color and pigmentation.        LABS:                        11.8   6.48  )-----------( 119      ( 09 Aug 2023 05:30 )             35.9     08-09    130<L>  |  93<L>  |  23  ----------------------------<  108<H>  4.1   |  26  |  0.95    Ca    8.4      09 Aug 2023 05:30  Phos  4.9     08-09  Mg     1.8     08-09    TPro  6.2  /  Alb  3.4  /  TBili  0.9  /  DBili  0.3  /  AST  58<H>  /  ALT  61<H>  /  AlkPhos  118  08-09    PT/INR - ( 09 Aug 2023 05:30 )   PT: 15.1 sec;   INR: 1.34          PTT - ( 09 Aug 2023 05:30 )  PTT:41.8 sec    Assessment Plan:    89 year old Cantonese speaking female with history of HTN, history of Medtronic dual chamber pacemaker (unclear initial indication) initially implanted in 2012 s/p gen change 2022, history of aortic bioprosthetic valve implanted in 2009, who presents with leg swelling and dypsnea on exertion for the past week. Pt saw cardiologist Dr. Lopez on 8/4, pt and daughter refused to go to hospital at that time, now presents with worsening respiratory symptoms and ADHF. Pt been in persistent AF since July 29th but was having long paroxysms before that as well. EF noted to be 35-40%, pt also noted to have severe MR.    -plan for DCCV prior to discharge. Pt would need a CT scan or MISAEL to r/o SEB thrombus prior to DCCV  -consider workup/mgmt for severe MR (pt may not maintain NSR long term if patient having severe MR)  -likely plan for initiation of amiodarone after DCCV

## 2023-08-09 NOTE — PROGRESS NOTE ADULT - PROBLEM SELECTOR PLAN 4
EP interrogate PPM Hypertensive -160s/90-100s  -Increased Entresto to 49/51mg BID  -Increase back to home dose Metoprolol 50mg qd Hypertensive -160s/90-100s  -Increased Entresto to 49/51mg BID  -Increase back to home dose Metoprolol 50mg qd    #Hyponatremia  Na 126 on arrival. Likely hypervolemic hyponatremia  - Medicine team following  - Improving Na 130 (8/9)   - 1L fluid restriction   - F/u Serum osm, TSH, urine osm, urine sodium

## 2023-08-09 NOTE — PHYSICAL THERAPY INITIAL EVALUATION ADULT - GAIT DEVIATIONS NOTED, PT EVAL
Unsteady gait, min impulsive while ambulating, pt declining further distance secondary to stating she is too hungry to participate from being NPO for a test/decreased tena/decreased velocity of limb motion/decreased step length/decreased weight-shifting ability

## 2023-08-09 NOTE — PROGRESS NOTE ADULT - PROBLEM SELECTOR PROBLEM 1
Acute exacerbation of CHF (congestive heart failure) Acute HFrEF (heart failure with reduced ejection fraction)

## 2023-08-09 NOTE — PROGRESS NOTE ADULT - SUBJECTIVE AND OBJECTIVE BOX
CARDIOLOGY NP PROGRESS NOTE    Subjective:   Remainder ROS otherwise negative.    Overnight Events:     TELEMETRY:    EKG:      VITAL SIGNS:  T(C): 36.4 (08-09-23 @ 08:39), Max: 36.8 (08-08-23 @ 18:17)  HR: 112 (08-09-23 @ 08:39) (76 - 116)  BP: 162/98 (08-09-23 @ 08:39) (132/83 - 169/107)  RR: 17 (08-09-23 @ 08:39) (17 - 30)  SpO2: 97% (08-09-23 @ 08:39) (92% - 100%)  Wt(kg): --    I&O's Summary    08 Aug 2023 07:01  -  09 Aug 2023 07:00  --------------------------------------------------------  IN: 0 mL / OUT: 1200 mL / NET: -1200 mL    09 Aug 2023 07:01  -  09 Aug 2023 12:32  --------------------------------------------------------  IN: 240 mL / OUT: 650 mL / NET: -410 mL          PHYSICAL EXAM:    General: A/ox 3, No acute Distress  Neck: Supple, NO JVD  Cardiac: S1 S2, No M/R/G  Pulmonary: CTAB, Breathing unlabored, No Rhonchi/Rales/Wheezing  Abdomen: Soft, Non -tender, +BS x 4 quads  Extremities: No Rashes, No edema  Neuro: A/o x 3, No focal deficits          LABS:                          11.8   6.48  )-----------( 119      ( 09 Aug 2023 05:30 )             35.9                              08-09    130<L>  |  93<L>  |  23  ----------------------------<  108<H>  4.1   |  26  |  0.95    Ca    8.4      09 Aug 2023 05:30  Phos  4.9     08-09  Mg     1.8     08-09    TPro  6.2  /  Alb  3.4  /  TBili  0.9  /  DBili  0.3  /  AST  58<H>  /  ALT  61<H>  /  AlkPhos  118  08-09    LIVER FUNCTIONS - ( 09 Aug 2023 05:30 )  Alb: 3.4 g/dL / Pro: 6.2 g/dL / ALK PHOS: 118 U/L / ALT: 61 U/L / AST: 58 U/L / GGT: x                                 PT/INR - ( 09 Aug 2023 05:30 )   PT: 15.1 sec;   INR: 1.34          PTT - ( 09 Aug 2023 05:30 )  PTT:41.8 sec  CAPILLARY BLOOD GLUCOSE                Allergies:  No Known Allergies    MEDICATIONS  (STANDING):  apixaban 2.5 milliGRAM(s) Oral every 12 hours  furosemide   Injectable 40 milliGRAM(s) IV Push two times a day  magnesium oxide 400 milliGRAM(s) Oral once  metoprolol succinate ER 25 milliGRAM(s) Oral daily  polyethylene glycol 3350 17 Gram(s) Oral daily  sacubitril 24 mG/valsartan 26 mG 1 Tablet(s) Oral two times a day  senna 2 Tablet(s) Oral at bedtime    MEDICATIONS  (PRN):  ipratropium    for Nebulization 500 MICROGram(s) Nebulizer every 6 hours PRN Shortness of Breath and/or Wheezing  LORazepam     Tablet 0.5 milliGRAM(s) Oral two times a day PRN Anxiety        DIAGNOSTIC TESTS:        CARDIOLOGY NP PROGRESS NOTE    Subjective: Pt seen and examined at bedside. Reports feeling improvement in SOB w/ diuresis. Denies chest pain, lightheadedness, dizziness, palpitations, fever, chills.  Remainder ROS otherwise negative.    Overnight Events: net negative 1.2L over last 24hrs    TELEMETRY: Afib 80-120s, occasional PVCs      VITAL SIGNS:  T(C): 36.4 (08-09-23 @ 08:39), Max: 36.8 (08-08-23 @ 18:17)  HR: 112 (08-09-23 @ 08:39) (76 - 116)  BP: 162/98 (08-09-23 @ 08:39) (132/83 - 169/107)  RR: 17 (08-09-23 @ 08:39) (17 - 30)  SpO2: 97% (08-09-23 @ 08:39) (92% - 100%)  Wt(kg): --    I&O's Summary    08 Aug 2023 07:01  -  09 Aug 2023 07:00  --------------------------------------------------------  IN: 0 mL / OUT: 1200 mL / NET: -1200 mL    09 Aug 2023 07:01  -  09 Aug 2023 12:32  --------------------------------------------------------  IN: 240 mL / OUT: 650 mL / NET: -410 mL          PHYSICAL EXAM:    General: A/ox 3, No acute Distress  Neck: Supple, + JVD  Cardiac: S1 S2, grade IV/VI systolic murmur at apex  Pulmonary: Lungs w/ bibasilar crackles, Breathing unlabored on 2L NC, No Rhonchi/Wheezing  Abdomen: Soft, Non -tender, +BS x 4 quads  Extremities: No Rashes, Pedro 1+ LE edema  Neuro: A/o x 3, No focal deficits          LABS:                          11.8   6.48  )-----------( 119      ( 09 Aug 2023 05:30 )             35.9                              08-09    130<L>  |  93<L>  |  23  ----------------------------<  108<H>  4.1   |  26  |  0.95    Ca    8.4      09 Aug 2023 05:30  Phos  4.9     08-09  Mg     1.8     08-09    TPro  6.2  /  Alb  3.4  /  TBili  0.9  /  DBili  0.3  /  AST  58<H>  /  ALT  61<H>  /  AlkPhos  118  08-09    LIVER FUNCTIONS - ( 09 Aug 2023 05:30 )  Alb: 3.4 g/dL / Pro: 6.2 g/dL / ALK PHOS: 118 U/L / ALT: 61 U/L / AST: 58 U/L / GGT: x         PT/INR - ( 09 Aug 2023 05:30 )   PT: 15.1 sec;   INR: 1.34          PTT - ( 09 Aug 2023 05:30 )  PTT:41.8 sec  CAPILLARY BLOOD GLUCOSE                Allergies:  No Known Allergies    MEDICATIONS  (STANDING):  apixaban 2.5 milliGRAM(s) Oral every 12 hours  furosemide   Injectable 40 milliGRAM(s) IV Push two times a day  magnesium oxide 400 milliGRAM(s) Oral once  metoprolol succinate ER 25 milliGRAM(s) Oral daily  polyethylene glycol 3350 17 Gram(s) Oral daily  sacubitril 24 mG/valsartan 26 mG 1 Tablet(s) Oral two times a day  senna 2 Tablet(s) Oral at bedtime    MEDICATIONS  (PRN):  ipratropium    for Nebulization 500 MICROGram(s) Nebulizer every 6 hours PRN Shortness of Breath and/or Wheezing  LORazepam     Tablet 0.5 milliGRAM(s) Oral two times a day PRN Anxiety        DIAGNOSTIC TESTS:     < from: TTE Echo Complete w/o Contrast w/ Doppler (08.09.23 @ 11:31) >  CONCLUSIONS:     1. Moderately reduced left ventricular systolic function.   2. Mildly dilated left ventricular size.   3. Mildly dilated right ventricular size.   4. Reduced right ventricular systolic function.   5. Biatrial enlargement.   6. 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   7. Severe mitral regurgitation.   8. Moderate tricuspid regurgitation.   9. Moderate pulmonic regurgitation.  10. Pulmonary hypertension present, pulmonary artery systolic pressure is 46 mmHg.  11. No pericardial effusion.  12. Mildly dilated ascending aorta.  13. No prior echo is available for comparison.    < end of copied text >

## 2023-08-09 NOTE — PHYSICAL THERAPY INITIAL EVALUATION ADULT - ADDITIONAL COMMENTS
As per pt, PTA she required assistance with all functional mobility, ADLs, and IADLs. Pt has a home health aide 5d x 6h. Ambulates short distances with RW, has a shower tub with grab bars and shower chair.

## 2023-08-09 NOTE — PROGRESS NOTE ADULT - PROBLEM SELECTOR PLAN 3
Monitor BP  home medication Metoprolol XL 50mg daily; Entresto 24-26mg bid Echo w/ severe MR, mod TR/GA, bioprosthetic AVR apparent normal function.  -BP control w/ Entresto 49/51mg BID and Toprol  -c/w IV diuresis  -Consider SHD eval for possible Mitraclip if aligned w/ pt's GOC. Will discuss w/ outpt cardiologist Dr Lopez

## 2023-08-09 NOTE — PROGRESS NOTE ADULT - PROBLEM SELECTOR PLAN 1
Recently diagnosed with CHF prescribed Lasix 40mg daily; Entresto 24-26mg bid (EF unknown)  BNP 9310  Lasix 40mg IV bid  on home Metoprolol XL 50mg daily; continue Metoprolol XL25mg daily   core measure (daily weight and strict I&O q4hrs)  TTE in AM  discuss further cardiac workup  GDMT Recently diagnosed with HFrEF (EF 40%) by outpt cardiologist Dr Jerome Lopez 2 weeks ago, and started on Lasix 40mg daily, Entresto 24/26mg BID, Toprol 50mg qd.  -Etiology possibly 2/2 Afib RVR, severe MR, vs ischemic in nature  -BNP 9310, hs Trop T negative x1. AST//82 (improving)  -CXR w/ pulm vasc congestion  -EKG Afib 120s, Q waves V1-V3  -Outpt Echo 7/27/23: EF 40%, severely dilated LA, mildly dilated RA, mild conc LVH, G2DD, normal function AVR, mod-severe MR  -ECHO 8/9/23 @ [facility]: EF 35-40%, global hypokinesis, mildly dilated LV/RV, MATI, reduced RVSF, bioprosthetic AVR apparent normal function, severe MR, mod TR/AZ.  -c/w Lasix 40mg IV BID, net negative 1.6L so far  -Wean off O2 as tolerated, currently on 2L NC  -Increase home Entresto 24/26mg to 49/51mg BID  -c/w Toprol 25mg daily, will add Lopressor 25mg x1 this evening. Resume home dose Toprol 50mg qd in AM  -HF core measure, daily weight. strict I&Os

## 2023-08-09 NOTE — PHYSICAL THERAPY INITIAL EVALUATION ADULT - PERTINENT HX OF CURRENT PROBLEM, REHAB EVAL
89 y/o Cantonese speaking female presents to [facility] ER evening, 8/8/23, accompanied by daughter, with PEREZ, b/l UE LE swelling X2 . In speaking with daughter, she reports her mother has been experiencing worsening PEREZ for one month and was recently diagnosed with CHF, prescribed Lasix 40 daily and Entresto 24-26mg bid. Daughter states despite taking her medication her symptoms have worsened in the last two days and her mother's Cardiologist recommended her to go to [facility] ER for further evaluation.

## 2023-08-09 NOTE — CONSULT NOTE ADULT - ASSESSMENT
89 y/oF  Cantonese speaking PMHx of HTN, paroxysmal AFib ( on Eliquis 2.5mg), bioprosthetic AVR # NYP-Epi ( 2009, past AV peak gradient 1.7 in 2022), hx PPM ( 2012) and s/p generator change ( 7/2022 at [facility] by EP Dr. Donovan Wolff), developed PEREZ/leg swelling last Friday 8/4/23 however refused to go the hospital, Cardiologist  prescribed standing Lasix 40mg daily and started Entresto 24/26 bid  for CHF and advised daughter to take pt to the hospital if no improvement. Daughter brought pt to [facility] ED ( 8/8) , admitted for acute hypoxic respiratory failure ( required NC2L) 2/2 ADHF ( proBNP 9310), and hyponatremia ( SNa+ 126), and mild transaminitis. Pt was admitted to Cardiology service, and started on O2 via NC2L, and lasix 40mg iv bid.   Pt seen this am, resting in bed, no apparent distress, on NC2L, feels better but still has residual SOB.     # Acute hypoxic respiratory failure   # ADHF 2/2 acute systolic heart failure (EF 40% 1/2022)  # pAFib  # hx bio-AVR   # s/p PPM(2012)/generator change ( 7/2022)   - active care per cardiology service, Dr. Autumn Galvan (case discussed)  - O2 via NC 2L, titrate to keep SpO2>90%  - ipratropium    for Nebulization 500 MICROGram(s) Nebulizer every 6 hours PRN  - furosemide   Injectable 40 milliGRAM(s) IV Push two times a day ( 8/8-)   - monitor UO/primafit   - one liter fluid restriction   - keep K>4 and Mg>2  - GDMT:   metoprolol succinate ER 25 milliGRAM(s) Oral daily  sacubitril 24 mG/valsartan 26 mG 1 Tablet(s) Oral two times a day  - TTE ordered   - EP to interrogate PPM   -pAFib: c/w DOAC apixaban 2.5 milliGRAM(s) Oral every 12 hours    # Hyponatremia  - likely dilutional   - SNa+ 126(8/8)-> 130(8/9)   - one liter fluid restriction   - Serum osm, TSH, urine osm, urine sodium     # Constipation  - polyethylene glycol 3350 17 Gram(s) Oral daily    # Gait abnormality  - baseline WC assist  - PT eval/bedside PT   - fall precaution     # DVT ppx: DOAC     # code status: FULL CODE     # Contact:  [facility] Cardiology: Dr.David Lopez [phone]  PMD: Dr. Gaudencio Wolff [phone]     Ileana Sapp (daughter) [phone]    # Disposition: medically active, d/c plan likely later this week when euvolemic and hyponatremia deemed stable     # POC as d/w cardiology team Dr. Galvan/Hollie KABA , and referring Cardiologist Dr. Jerome Lopez and PMD Dr. Gaudencio Wolff

## 2023-08-09 NOTE — CONSULT NOTE ADULT - SUBJECTIVE AND OBJECTIVE BOX
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 Donovan Wolff), developed PEREZ/leg swelling last Friday 8/4/23 however refused to go the hospital, Cardiologist  prescribed standing Lasix 40mg daily and started Entresto 24/26 bid  for CHF and advised daughter to take pt to the hospital if no improvement. Daughter brought pt to [facility] ED ( 8/8) , admitted for acute hypoxic respiratory failure ( required NC2L) 2/2 ADHF ( proBNP 9310), and hyponatremia ( SNa+ 126), and mild transaminitis. Pt was admitted to Cardiology service, and started on O2 via NC2L, and lasix 40mg iv bid.   Pt seen this am, resting in bed, no apparent distress, on NC2L, feels better but still has residual SOB.     PAST MEDICAL & SURGICAL HISTORY:  Atrial fibrillation      HTN (hypertension)      Acute CHF      Cardiac pacemaker      Heart valve replaced      S/P hip replacement      Cardiac pacemaker        Home Medications:  Eliquis 2.5 mg oral tablet: 1 orally 2 times a day (08 Aug 2023 22:44)  Entresto 24 mg-26 mg oral tablet: 1 orally 2 times a day (08 Aug 2023 22:44)  Lasix 40 mg oral tablet: 1 orally once a day (08 Aug 2023 22:39)  LORazepam 1 mg oral tablet: 1 orally 2 times a day as needed for  anxiety (08 Aug 2023 22:44)  metoprolol succinate 50 mg oral tablet, extended release: 1 orally once a day (08 Aug 2023 22:44)    Allergies    No Known Allergies    Intolerances      FAMILY HISTORY:    Social History:  Cantonese speaking female, single, lives with daughter, denies Tobacco, EtOH and illicit drug use. (08 Aug 2023 22:04)      REVIEW OF SYSTEMS:  CONSTITUTIONAL: No fever, weight loss  EYES: No eye pain, or discharge  ENMT:  No tinnitus, vertigo  NECK: No pain or stiffness  RESPIRATORY: No cough, No dyspnea  CARDIOVASCULAR: No chest pain, or leg swelling  GASTROINTESTINAL: No abdominal pain. No diarrhea ;No melena or hematochezia.  GENITOURINARY: No dysuria, frequency, or hematuria  NEUROLOGICAL: No numbness, or tremors  SKIN: No itching, burning, rashes, or lesions   ENDOCRINE: No heat or cold intolerance;  MUSCULOSKELETAL: No joint pain or swelling;   PSYCHIATRIC: No mood swings, or difficulty sleeping  HEME/LYMPH: No easy bruising, or bleeding gums  ALLERGY AND IMMUNOLOGIC: No hives or eczema        CURRENT MEDICATIONS:   apixaban 2.5 milliGRAM(s) Oral every 12 hours  furosemide   Injectable 40 milliGRAM(s) IV Push two times a day  ipratropium    for Nebulization 500 MICROGram(s) Nebulizer every 6 hours PRN  LORazepam     Tablet 0.5 milliGRAM(s) Oral two times a day PRN  magnesium oxide 400 milliGRAM(s) Oral once  metoprolol succinate ER 25 milliGRAM(s) Oral daily  polyethylene glycol 3350 17 Gram(s) Oral daily  sacubitril 24 mG/valsartan 26 mG 1 Tablet(s) Oral two times a day  senna 2 Tablet(s) Oral at bedtime      VITAL SIGNS, INS/OUTS (last 24 hours):  Vital Signs Last 24 Hrs  T(C): 36.4 (09 Aug 2023 08:39), Max: 36.8 (08 Aug 2023 18:17)  T(F): 97.5 (09 Aug 2023 08:39), Max: 98.2 (08 Aug 2023 18:17)  HR: 112 (09 Aug 2023 08:39) (76 - 116)  BP: 162/98 (09 Aug 2023 08:39) (132/83 - 169/107)  BP(mean): --  RR: 17 (09 Aug 2023 08:39) (17 - 30)  SpO2: 97% (09 Aug 2023 08:39) (92% - 100%)    Parameters below as of 09 Aug 2023 08:39  Patient On (Oxygen Delivery Method): nasal cannula  O2 Flow (L/min): 2    I&O's Summary    08 Aug 2023 07:01  -  09 Aug 2023 07:00  --------------------------------------------------------  IN: 0 mL / OUT: 1200 mL / NET: -1200 mL    09 Aug 2023 07:01  -  09 Aug 2023 12:12  --------------------------------------------------------  IN: 240 mL / OUT: 650 mL / NET: -410 mL        PHYSICAL EXAM:  Gen: Reclining in bed at time of exam, appears stated age  HEENT: NCAT, MMM, clear OP  Neck: supple, trachea at midline  CV: RRR, +S1/S2  Pulm: adequate respiratory effort, no increase in work of breathing, minimal basilar crackles   Abd: soft, NTND  Skin: warm and dry,  Ext: WWP, trace preankle LE edema  Neuro: AOx3, no gross focal neurological deficits  Psych: affect and behavior appropriate, pleasant at time of interview    BASIC LABS:                        11.8   6.48  )-----------( 119      ( 09 Aug 2023 05:30 )             35.9     08-09    130<L>  |  93<L>  |  23  ----------------------------<  108<H>  4.1   |  26  |  0.95    Ca    8.4      09 Aug 2023 05:30  Phos  4.9     08-09  Mg     1.8     08-09    TPro  6.2  /  Alb  3.4  /  TBili  0.9  /  DBili  0.3  /  AST  58<H>  /  ALT  61<H>  /  AlkPhos  118  08-09    PT/INR - ( 09 Aug 2023 05:30 )   PT: 15.1 sec;   INR: 1.34          PTT - ( 09 Aug 2023 05:30 )  PTT:41.8 sec  Urinalysis Basic - ( 09 Aug 2023 05:30 )    Color: x / Appearance: x / SG: x / pH: x  Gluc: 108 mg/dL / Ketone: x  / Bili: x / Urobili: x   Blood: x / Protein: x / Nitrite: x   Leuk Esterase: x / RBC: x / WBC x   Sq Epi: x / Non Sq Epi: x / Bacteria: x      CAPILLARY BLOOD GLUCOSE          OTHER LABS:        MICRODATA:      IMAGING:    EKG:    #Diet -       #DVT PPx -

## 2023-08-10 ENCOUNTER — TRANSCRIPTION ENCOUNTER (OUTPATIENT)
Age: 88
End: 2023-08-10

## 2023-08-10 LAB
ALBUMIN SERPL ELPH-MCNC: 3.5 G/DL — SIGNIFICANT CHANGE UP (ref 3.3–5)
ALP SERPL-CCNC: 114 U/L — SIGNIFICANT CHANGE UP (ref 40–120)
ALT FLD-CCNC: 51 U/L — HIGH (ref 10–45)
ANION GAP SERPL CALC-SCNC: 9 MMOL/L — SIGNIFICANT CHANGE UP (ref 5–17)
AST SERPL-CCNC: 44 U/L — HIGH (ref 10–40)
BILIRUB SERPL-MCNC: 0.7 MG/DL — SIGNIFICANT CHANGE UP (ref 0.2–1.2)
BLD GP AB SCN SERPL QL: NEGATIVE — SIGNIFICANT CHANGE UP
BUN SERPL-MCNC: 32 MG/DL — HIGH (ref 7–23)
CALCIUM SERPL-MCNC: 9 MG/DL — SIGNIFICANT CHANGE UP (ref 8.4–10.5)
CHLORIDE SERPL-SCNC: 93 MMOL/L — LOW (ref 96–108)
CO2 SERPL-SCNC: 31 MMOL/L — SIGNIFICANT CHANGE UP (ref 22–31)
CREAT SERPL-MCNC: 1.18 MG/DL — SIGNIFICANT CHANGE UP (ref 0.5–1.3)
EGFR: 43 ML/MIN/1.73M2 — LOW
GLUCOSE SERPL-MCNC: 104 MG/DL — HIGH (ref 70–99)
HCT VFR BLD CALC: 37.7 % — SIGNIFICANT CHANGE UP (ref 34.5–45)
HGB BLD-MCNC: 12.5 G/DL — SIGNIFICANT CHANGE UP (ref 11.5–15.5)
MAGNESIUM SERPL-MCNC: 2 MG/DL — SIGNIFICANT CHANGE UP (ref 1.6–2.6)
MCHC RBC-ENTMCNC: 30.4 PG — SIGNIFICANT CHANGE UP (ref 27–34)
MCHC RBC-ENTMCNC: 33.2 GM/DL — SIGNIFICANT CHANGE UP (ref 32–36)
MCV RBC AUTO: 91.7 FL — SIGNIFICANT CHANGE UP (ref 80–100)
NRBC # BLD: 0 /100 WBCS — SIGNIFICANT CHANGE UP (ref 0–0)
OSMOLALITY SERPL: 287 MOSM/KG — SIGNIFICANT CHANGE UP (ref 280–301)
PLATELET # BLD AUTO: 130 K/UL — LOW (ref 150–400)
POTASSIUM SERPL-MCNC: 4.2 MMOL/L — SIGNIFICANT CHANGE UP (ref 3.5–5.3)
POTASSIUM SERPL-SCNC: 4.2 MMOL/L — SIGNIFICANT CHANGE UP (ref 3.5–5.3)
PROT SERPL-MCNC: 6.6 G/DL — SIGNIFICANT CHANGE UP (ref 6–8.3)
RBC # BLD: 4.11 M/UL — SIGNIFICANT CHANGE UP (ref 3.8–5.2)
RBC # FLD: 13.5 % — SIGNIFICANT CHANGE UP (ref 10.3–14.5)
RH IG SCN BLD-IMP: POSITIVE — SIGNIFICANT CHANGE UP
SODIUM SERPL-SCNC: 133 MMOL/L — LOW (ref 135–145)
WBC # BLD: 8.01 K/UL — SIGNIFICANT CHANGE UP (ref 3.8–10.5)
WBC # FLD AUTO: 8.01 K/UL — SIGNIFICANT CHANGE UP (ref 3.8–10.5)

## 2023-08-10 PROCEDURE — 99233 SBSQ HOSP IP/OBS HIGH 50: CPT

## 2023-08-10 PROCEDURE — 92960 CARDIOVERSION ELECTRIC EXT: CPT

## 2023-08-10 PROCEDURE — 99232 SBSQ HOSP IP/OBS MODERATE 35: CPT

## 2023-08-10 PROCEDURE — 93320 DOPPLER ECHO COMPLETE: CPT | Mod: 26

## 2023-08-10 PROCEDURE — 93312 ECHO TRANSESOPHAGEAL: CPT | Mod: 26

## 2023-08-10 PROCEDURE — 76377 3D RENDER W/INTRP POSTPROCES: CPT | Mod: 26

## 2023-08-10 RX ORDER — DIGOXIN 250 MCG
125 TABLET ORAL ONCE
Refills: 0 | Status: COMPLETED | OUTPATIENT
Start: 2023-08-11 | End: 2023-08-11

## 2023-08-10 RX ORDER — DIGOXIN 250 MCG
250 TABLET ORAL ONCE
Refills: 0 | Status: COMPLETED | OUTPATIENT
Start: 2023-08-10 | End: 2023-08-10

## 2023-08-10 RX ADMIN — POLYETHYLENE GLYCOL 3350 17 GRAM(S): 17 POWDER, FOR SOLUTION ORAL at 14:55

## 2023-08-10 RX ADMIN — Medication 250 MICROGRAM(S): at 14:55

## 2023-08-10 RX ADMIN — SACUBITRIL AND VALSARTAN 1 TABLET(S): 24; 26 TABLET, FILM COATED ORAL at 05:39

## 2023-08-10 RX ADMIN — Medication 40 MILLIGRAM(S): at 05:39

## 2023-08-10 RX ADMIN — SACUBITRIL AND VALSARTAN 1 TABLET(S): 24; 26 TABLET, FILM COATED ORAL at 18:15

## 2023-08-10 RX ADMIN — Medication 50 MILLIGRAM(S): at 05:39

## 2023-08-10 RX ADMIN — APIXABAN 2.5 MILLIGRAM(S): 2.5 TABLET, FILM COATED ORAL at 05:39

## 2023-08-10 RX ADMIN — APIXABAN 2.5 MILLIGRAM(S): 2.5 TABLET, FILM COATED ORAL at 18:15

## 2023-08-10 NOTE — PROGRESS NOTE ADULT - SUBJECTIVE AND OBJECTIVE BOX
CARDIOLOGY NP PROGRESS NOTE    Subjective: Pt seen and examined at bedside. Denies SOB, chest pain, lightheadedness, dizziness, palpitations, fever, chills. Patient endorses constipation. Remainder ROS otherwise negative.    Overnight Events: net negative 1.2L over last 24hrs    TELEMETRY: Afib 80-120s, occasional PVCs      VITAL SIGNS:  T(C): 36.4 (08-09-23 @ 08:39), Max: 36.8 (08-08-23 @ 18:17)  HR: 112 (08-09-23 @ 08:39) (76 - 116)  BP: 162/98 (08-09-23 @ 08:39) (132/83 - 169/107)  RR: 17 (08-09-23 @ 08:39) (17 - 30)  SpO2: 97% (08-09-23 @ 08:39) (92% - 100%)  Wt(kg): --    I&O's Summary    08 Aug 2023 07:01  -  09 Aug 2023 07:00  --------------------------------------------------------  IN: 0 mL / OUT: 1200 mL / NET: -1200 mL    09 Aug 2023 07:01  -  09 Aug 2023 12:32  --------------------------------------------------------  IN: 240 mL / OUT: 650 mL / NET: -410 mL          PHYSICAL EXAM:    General: A/ox 3, No acute Distress  Neck: Supple, + JVD  Cardiac: S1 S2, grade IV/VI systolic murmur at apex  Pulmonary: Lungs w/ bibasilar crackles, Breathing unlabored on 2L NC, No Rhonchi/Wheezing  Abdomen: Soft, Non -tender, +BS x 4 quads  Extremities: No Rashes, Pedro 1+ LE edema  Neuro: A/o x 3, No focal deficits          LABS:                          11.8   6.48  )-----------( 119      ( 09 Aug 2023 05:30 )             35.9                              08-09    130<L>  |  93<L>  |  23  ----------------------------<  108<H>  4.1   |  26  |  0.95    Ca    8.4      09 Aug 2023 05:30  Phos  4.9     08-09  Mg     1.8     08-09    TPro  6.2  /  Alb  3.4  /  TBili  0.9  /  DBili  0.3  /  AST  58<H>  /  ALT  61<H>  /  AlkPhos  118  08-09    LIVER FUNCTIONS - ( 09 Aug 2023 05:30 )  Alb: 3.4 g/dL / Pro: 6.2 g/dL / ALK PHOS: 118 U/L / ALT: 61 U/L / AST: 58 U/L / GGT: x         PT/INR - ( 09 Aug 2023 05:30 )   PT: 15.1 sec;   INR: 1.34          PTT - ( 09 Aug 2023 05:30 )  PTT:41.8 sec  CAPILLARY BLOOD GLUCOSE    Allergies:  No Known Allergies    MEDICATIONS  (STANDING):  apixaban 2.5 milliGRAM(s) Oral every 12 hours  furosemide   Injectable 40 milliGRAM(s) IV Push two times a day  magnesium oxide 400 milliGRAM(s) Oral once  metoprolol succinate ER 25 milliGRAM(s) Oral daily  polyethylene glycol 3350 17 Gram(s) Oral daily  sacubitril 24 mG/valsartan 26 mG 1 Tablet(s) Oral two times a day  senna 2 Tablet(s) Oral at bedtime    MEDICATIONS  (PRN):  ipratropium    for Nebulization 500 MICROGram(s) Nebulizer every 6 hours PRN Shortness of Breath and/or Wheezing  LORazepam     Tablet 0.5 milliGRAM(s) Oral two times a day PRN Anxiety        DIAGNOSTIC TESTS:     < from: TTE Echo Complete w/o Contrast w/ Doppler (08.09.23 @ 11:31) >  CONCLUSIONS:     1. Moderately reduced left ventricular systolic function.   2. Mildly dilated left ventricular size.   3. Mildly dilated right ventricular size.   4. Reduced right ventricular systolic function.   5. Biatrial enlargement.   6. 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   7. Severe mitral regurgitation.   8. Moderate tricuspid regurgitation.   9. Moderate pulmonic regurgitation.  10. Pulmonary hypertension present, pulmonary artery systolic pressure is 46 mmHg.  11. No pericardial effusion.  12. Mildly dilated ascending aorta.  13. No prior echo is available for comparison.    < end of copied text >     CARDIOLOGY NP PROGRESS NOTE    Subjective: Pt seen and examined at bedside. Denies SOB, chest pain, lightheadedness, dizziness, palpitations, fever, chills. Patient endorses constipation. Remainder ROS otherwise negative.    Overnight Events: net negative 1.8L over last 24hrs    TELEMETRY: Afib 80-120s, occasional PVCs      VITAL SIGNS:  T(C): 36.4 (08-09-23 @ 08:39), Max: 36.8 (08-08-23 @ 18:17)  HR: 112 (08-09-23 @ 08:39) (76 - 116)  BP: 162/98 (08-09-23 @ 08:39) (132/83 - 169/107)  RR: 17 (08-09-23 @ 08:39) (17 - 30)  SpO2: 97% (08-09-23 @ 08:39) (92% - 100%)  Wt(kg): --    I&O's Summary    08 Aug 2023 07:01  -  09 Aug 2023 07:00  --------------------------------------------------------  IN: 0 mL / OUT: 1200 mL / NET: -1200 mL    09 Aug 2023 07:01  -  09 Aug 2023 12:32  --------------------------------------------------------  IN: 240 mL / OUT: 650 mL / NET: -410 mL          PHYSICAL EXAM:    General: A/ox 3, No acute Distress  Neck: Supple, + JVD  Cardiac: S1 S2, grade IV/VI systolic murmur at apex  Pulmonary: Lungs w/ bibasilar crackles, Breathing unlabored on 2L NC, No Rhonchi/Wheezing  Abdomen: Soft, Non -tender, +BS x 4 quads  Extremities: No Rashes, Pedro 1+ LE edema  Neuro: A/o x 3, No focal deficits          LABS:                          11.8   6.48  )-----------( 119      ( 09 Aug 2023 05:30 )             35.9                              08-09    130<L>  |  93<L>  |  23  ----------------------------<  108<H>  4.1   |  26  |  0.95    Ca    8.4      09 Aug 2023 05:30  Phos  4.9     08-09  Mg     1.8     08-09    TPro  6.2  /  Alb  3.4  /  TBili  0.9  /  DBili  0.3  /  AST  58<H>  /  ALT  61<H>  /  AlkPhos  118  08-09    LIVER FUNCTIONS - ( 09 Aug 2023 05:30 )  Alb: 3.4 g/dL / Pro: 6.2 g/dL / ALK PHOS: 118 U/L / ALT: 61 U/L / AST: 58 U/L / GGT: x         PT/INR - ( 09 Aug 2023 05:30 )   PT: 15.1 sec;   INR: 1.34          PTT - ( 09 Aug 2023 05:30 )  PTT:41.8 sec  CAPILLARY BLOOD GLUCOSE    Allergies:  No Known Allergies    MEDICATIONS  (STANDING):  apixaban 2.5 milliGRAM(s) Oral every 12 hours  furosemide   Injectable 40 milliGRAM(s) IV Push two times a day  magnesium oxide 400 milliGRAM(s) Oral once  metoprolol succinate ER 25 milliGRAM(s) Oral daily  polyethylene glycol 3350 17 Gram(s) Oral daily  sacubitril 24 mG/valsartan 26 mG 1 Tablet(s) Oral two times a day  senna 2 Tablet(s) Oral at bedtime    MEDICATIONS  (PRN):  ipratropium    for Nebulization 500 MICROGram(s) Nebulizer every 6 hours PRN Shortness of Breath and/or Wheezing  LORazepam     Tablet 0.5 milliGRAM(s) Oral two times a day PRN Anxiety        DIAGNOSTIC TESTS:     < from: TTE Echo Complete w/o Contrast w/ Doppler (08.09.23 @ 11:31) >  CONCLUSIONS:     1. Moderately reduced left ventricular systolic function.   2. Mildly dilated left ventricular size.   3. Mildly dilated right ventricular size.   4. Reduced right ventricular systolic function.   5. Biatrial enlargement.   6. 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   7. Severe mitral regurgitation.   8. Moderate tricuspid regurgitation.   9. Moderate pulmonic regurgitation.  10. Pulmonary hypertension present, pulmonary artery systolic pressure is 46 mmHg.  11. No pericardial effusion.  12. Mildly dilated ascending aorta.  13. No prior echo is available for comparison.    < end of copied text >     Interventional Cardiology PA Adult Progress Note    Acute on Chronic CHF Exacerbation    Subjective Assessment: Patient states she is doing well      ROS Negative except as per Subjective and HPI  	  MEDICATIONS:  metoprolol succinate ER 50 milliGRAM(s) Oral daily  sacubitril 49 mG/valsartan 51 mG 1 Tablet(s) Oral two times a day  ipratropium    for Nebulization 500 MICROGram(s) Nebulizer every 6 hours PRN  LOrazepam     Tablet 0.5 milliGRAM(s) Oral two times a day CA  polyethylene glycol 3350 17 Gram(s) Oral daily  senna 2 Tablet(s) Oral at bedtime  apixaban 2.5 milliGRAM(s) Oral every 12 hours      	    [PHYSICAL EXAM:  TELEMETRY:  T(C): 36.4 (08-10-23 @ 08:45), Max: 36.7 (08-09-23 @ 20:21)  HR: 98 (08-10-23 @ 08:45) (95 - 110)  BP: 142/84 (08-10-23 @ 08:45) (116/79 - 197/88)  RR: 16 (08-10-23 @ 08:45) (16 - 19)  SpO2: 99% (08-10-23 @ 08:45) (96% - 100%)  Wt(kg): --  I&O's Summary    09 Aug 2023 07:01  -  10 Aug 2023 07:00  --------------------------------------------------------  IN: 1600 mL / OUT: 2075 mL / NET: -475 mL    10 Aug 2023 07:01  -  10 Aug 2023 11:37  --------------------------------------------------------  IN: 0 mL / OUT: 150 mL / NET: -150 mL        Saleh:  Central/PICC/Mid Line:                                         Appearance: Normal	  HEENT:   Normal oral mucosa, PERRL, EOMI	  Neck: Supple, + JVD/ - JVD; Carotid Bruit   Cardiovascular: Normal S1 S2, No JVD, No murmurs,   Respiratory: Lungs clear to auscultation/Decreased Breath Sounds/No Rales, Rhonchi, Wheezing	  Gastrointestinal:  Soft, Non-tender, + BS	  Skin: No rashes, No ecchymoses, No cyanosis  Extremities: Normal range of motion, No clubbing, cyanosis or edema  Vascular: Peripheral pulses palpable 2+ bilaterally  Neurologic: Non-focal  Psychiatry: A & O x 3, Mood & affect appropriate      	    ECG:  	  RADIOLOGY:   DIAGNOSTIC TESTING:  [ ] Echocardiogram:  [ ]  Catheterization:  [ ] Stress Test:    [ ] MISAEL  OTHER: 	    LABS:	 	  CARDIAC MARKERS:                                  12.5   8.01  )-----------( 130      ( 10 Aug 2023 05:30 )             37.7     08-10    133<L>  |  93<L>  |  32<H>  ----------------------------<  104<H>  4.2   |  31  |  1.18    Ca    9.0      10 Aug 2023 05:30  Phos  4.9     08-09  Mg     2.0     08-10    TPro  6.6  /  Alb  3.5  /  TBili  0.7  /  DBili  x   /  AST  44<H>  /  ALT  51<H>  /  AlkPhos  114  08-10    proBNP:   Lipid Profile:   HgA1c:   TSH:   PT/INR - ( 09 Aug 2023 05:30 )   PT: 15.1 sec;   INR: 1.34          PTT - ( 09 Aug 2023 05:30 )  PTT:41.8 sec    ASSESSMENT/PLAN: 	        DVT ppx:  Dispo:        CARDIOLOGY NP PROGRESS NOTE    Subjective: Pt seen and examined at bedside. Denies SOB, chest pain, lightheadedness, dizziness, palpitations, fever, chills. Patient endorses constipation. Remainder ROS otherwise negative.    Overnight Events: net negative 1.8L over last 24hrs    TELEMETRY: Afib 80-120s, occasional PVCs      VITAL SIGNS:  T(C): 36.4 (08-09-23 @ 08:39), Max: 36.8 (08-08-23 @ 18:17)  HR: 112 (08-09-23 @ 08:39) (76 - 116)  BP: 162/98 (08-09-23 @ 08:39) (132/83 - 169/107)  RR: 17 (08-09-23 @ 08:39) (17 - 30)  SpO2: 97% (08-09-23 @ 08:39) (92% - 100%)  Wt(kg): --    I&O's Summary    08 Aug 2023 07:01  -  09 Aug 2023 07:00  --------------------------------------------------------  IN: 0 mL / OUT: 1200 mL / NET: -1200 mL    09 Aug 2023 07:01  -  09 Aug 2023 12:32  --------------------------------------------------------  IN: 240 mL / OUT: 650 mL / NET: -410 mL          PHYSICAL EXAM:    General: A/ox 3, No acute Distress  Neck: Supple, + JVD  Cardiac: S1 S2, grade IV/VI systolic murmur at apex  Pulmonary: Lungs w/ bibasilar crackles, Breathing unlabored on 2L NC, No Rhonchi/Wheezing  Abdomen: Soft, Non -tender, +BS x 4 quads  Extremities: No Rashes, Pedro 1+ LE edema  Neuro: A/o x 3, No focal deficits          LABS:                          11.8   6.48  )-----------( 119      ( 09 Aug 2023 05:30 )             35.9                              08-09    130<L>  |  93<L>  |  23  ----------------------------<  108<H>  4.1   |  26  |  0.95    Ca    8.4      09 Aug 2023 05:30  Phos  4.9     08-09  Mg     1.8     08-09    TPro  6.2  /  Alb  3.4  /  TBili  0.9  /  DBili  0.3  /  AST  58<H>  /  ALT  61<H>  /  AlkPhos  118  08-09    LIVER FUNCTIONS - ( 09 Aug 2023 05:30 )  Alb: 3.4 g/dL / Pro: 6.2 g/dL / ALK PHOS: 118 U/L / ALT: 61 U/L / AST: 58 U/L / GGT: x         PT/INR - ( 09 Aug 2023 05:30 )   PT: 15.1 sec;   INR: 1.34          PTT - ( 09 Aug 2023 05:30 )  PTT:41.8 sec  CAPILLARY BLOOD GLUCOSE    Allergies:  No Known Allergies    MEDICATIONS  (STANDING):  apixaban 2.5 milliGRAM(s) Oral every 12 hours  furosemide   Injectable 40 milliGRAM(s) IV Push two times a day  magnesium oxide 400 milliGRAM(s) Oral once  metoprolol succinate ER 25 milliGRAM(s) Oral daily  polyethylene glycol 3350 17 Gram(s) Oral daily  sacubitril 24 mG/valsartan 26 mG 1 Tablet(s) Oral two times a day  senna 2 Tablet(s) Oral at bedtime    MEDICATIONS  (PRN):  ipratropium    for Nebulization 500 MICROGram(s) Nebulizer every 6 hours PRN Shortness of Breath and/or Wheezing  LORazepam     Tablet 0.5 milliGRAM(s) Oral two times a day PRN Anxiety        DIAGNOSTIC TESTS:     < from: TTE Echo Complete w/o Contrast w/ Doppler (08.09.23 @ 11:31) >  CONCLUSIONS:     1. Moderately reduced left ventricular systolic function.   2. Mildly dilated left ventricular size.   3. Mildly dilated right ventricular size.   4. Reduced right ventricular systolic function.   5. Biatrial enlargement.   6. 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   7. Severe mitral regurgitation.   8. Moderate tricuspid regurgitation.   9. Moderate pulmonic regurgitation.  10. Pulmonary hypertension present, pulmonary artery systolic pressure is 46 mmHg.  11. No pericardial effusion.  12. Mildly dilated ascending aorta.  13. No prior echo is available for comparison.    < end of copied text >     Subjective: Pt seen and examined at bedside. Denies SOB, chest pain, lightheadedness, dizziness, palpitations, fever, chills. Patient endorses constipation. Remainder ROS otherwise negative.    Overnight Events: net negative 1.8L over last 24hrs    TELEMETRY: Afib 80-120s, occasional PVCs      VITAL SIGNS:  T(C): 36.4 (08-09-23 @ 08:39), Max: 36.8 (08-08-23 @ 18:17)  HR: 112 (08-09-23 @ 08:39) (76 - 116)  BP: 162/98 (08-09-23 @ 08:39) (132/83 - 169/107)  RR: 17 (08-09-23 @ 08:39) (17 - 30)  SpO2: 97% (08-09-23 @ 08:39) (92% - 100%)  Wt(kg): --    I&O's Summary    08 Aug 2023 07:01  -  09 Aug 2023 07:00  --------------------------------------------------------  IN: 0 mL / OUT: 1200 mL / NET: -1200 mL    09 Aug 2023 07:01  -  09 Aug 2023 12:32  --------------------------------------------------------  IN: 240 mL / OUT: 650 mL / NET: -410 mL          PHYSICAL EXAM:    General: A/ox 3, No acute Distress  Neck: Supple, + JVD  Cardiac: S1 S2, grade IV/VI systolic murmur at apex  Pulmonary: Lungs w/ bibasilar crackles, Breathing unlabored on 2L NC, No Rhonchi/Wheezing  Abdomen: Soft, Non -tender, +BS x 4 quads  Extremities: No Rashes, Pedro 1+ LE pedal edema  Neuro: A/o x 3, No focal deficits          LABS:                          11.8   6.48  )-----------( 119      ( 09 Aug 2023 05:30 )             35.9                              08-09    130<L>  |  93<L>  |  23  ----------------------------<  108<H>  4.1   |  26  |  0.95    Ca    8.4      09 Aug 2023 05:30  Phos  4.9     08-09  Mg     1.8     08-09    TPro  6.2  /  Alb  3.4  /  TBili  0.9  /  DBili  0.3  /  AST  58<H>  /  ALT  61<H>  /  AlkPhos  118  08-09    LIVER FUNCTIONS - ( 09 Aug 2023 05:30 )  Alb: 3.4 g/dL / Pro: 6.2 g/dL / ALK PHOS: 118 U/L / ALT: 61 U/L / AST: 58 U/L / GGT: x         PT/INR - ( 09 Aug 2023 05:30 )   PT: 15.1 sec;   INR: 1.34          PTT - ( 09 Aug 2023 05:30 )  PTT:41.8 sec  CAPILLARY BLOOD GLUCOSE    Allergies:  No Known Allergies    MEDICATIONS  (STANDING):  apixaban 2.5 milliGRAM(s) Oral every 12 hours  furosemide   Injectable 40 milliGRAM(s) IV Push two times a day  magnesium oxide 400 milliGRAM(s) Oral once  metoprolol succinate ER 25 milliGRAM(s) Oral daily  polyethylene glycol 3350 17 Gram(s) Oral daily  sacubitril 24 mG/valsartan 26 mG 1 Tablet(s) Oral two times a day  senna 2 Tablet(s) Oral at bedtime    MEDICATIONS  (PRN):  ipratropium    for Nebulization 500 MICROGram(s) Nebulizer every 6 hours PRN Shortness of Breath and/or Wheezing  LORazepam     Tablet 0.5 milliGRAM(s) Oral two times a day PRN Anxiety        DIAGNOSTIC TESTS:     < from: TTE Echo Complete w/o Contrast w/ Doppler (08.09.23 @ 11:31) >  CONCLUSIONS:     1. Moderately reduced left ventricular systolic function.   2. Mildly dilated left ventricular size.   3. Mildly dilated right ventricular size.   4. Reduced right ventricular systolic function.   5. Biatrial enlargement.   6. 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   7. Severe mitral regurgitation.   8. Moderate tricuspid regurgitation.   9. Moderate pulmonic regurgitation.  10. Pulmonary hypertension present, pulmonary artery systolic pressure is 46 mmHg.  11. No pericardial effusion.  12. Mildly dilated ascending aorta.  13. No prior echo is available for comparison.    < end of copied text >     Subjective: Pt seen and examined at bedside. Denies SOB, chest pain, lightheadedness, dizziness, palpitations, fever, chills. Patient endorses constipation. Remainder ROS otherwise negative.    Overnight Events: net negative 1.8L over last 24hrs    TELEMETRY: Afib 80-120s, occasional PVCs      VITAL SIGNS:  T(C): 36.4 (08-09-23 @ 08:39), Max: 36.8 (08-08-23 @ 18:17)  HR: 112 (08-09-23 @ 08:39) (76 - 116)  BP: 162/98 (08-09-23 @ 08:39) (132/83 - 169/107)  RR: 17 (08-09-23 @ 08:39) (17 - 30)  SpO2: 97% (08-09-23 @ 08:39) (92% - 100%)  Wt(kg): --    I&O's Summary    08 Aug 2023 07:01  -  09 Aug 2023 07:00  --------------------------------------------------------  IN: 0 mL / OUT: 1200 mL / NET: -1200 mL    09 Aug 2023 07:01  -  09 Aug 2023 12:32  --------------------------------------------------------  IN: 240 mL / OUT: 650 mL / NET: -410 mL          PHYSICAL EXAM:    General: A/ox 3, No acute Distress  Neck: Supple, - JVD  Cardiac: S1 S2, grade IV/VI systolic murmur at apex  Pulmonary: Lungs w/ bibasilar crackles, Breathing unlabored on 2L NC, No Rhonchi/Wheezing  Abdomen: Soft, Non -tender, +BS x 4 quads  Extremities: No Rashes, Pedro 1+ LE pedal edema  Neuro: A/o x 3, No focal deficits          LABS:                          11.8   6.48  )-----------( 119      ( 09 Aug 2023 05:30 )             35.9                              08-09    130<L>  |  93<L>  |  23  ----------------------------<  108<H>  4.1   |  26  |  0.95    Ca    8.4      09 Aug 2023 05:30  Phos  4.9     08-09  Mg     1.8     08-09    TPro  6.2  /  Alb  3.4  /  TBili  0.9  /  DBili  0.3  /  AST  58<H>  /  ALT  61<H>  /  AlkPhos  118  08-09    LIVER FUNCTIONS - ( 09 Aug 2023 05:30 )  Alb: 3.4 g/dL / Pro: 6.2 g/dL / ALK PHOS: 118 U/L / ALT: 61 U/L / AST: 58 U/L / GGT: x         PT/INR - ( 09 Aug 2023 05:30 )   PT: 15.1 sec;   INR: 1.34          PTT - ( 09 Aug 2023 05:30 )  PTT:41.8 sec  CAPILLARY BLOOD GLUCOSE    Allergies:  No Known Allergies    MEDICATIONS  (STANDING):  apixaban 2.5 milliGRAM(s) Oral every 12 hours  furosemide   Injectable 40 milliGRAM(s) IV Push two times a day  magnesium oxide 400 milliGRAM(s) Oral once  metoprolol succinate ER 25 milliGRAM(s) Oral daily  polyethylene glycol 3350 17 Gram(s) Oral daily  sacubitril 24 mG/valsartan 26 mG 1 Tablet(s) Oral two times a day  senna 2 Tablet(s) Oral at bedtime    MEDICATIONS  (PRN):  ipratropium    for Nebulization 500 MICROGram(s) Nebulizer every 6 hours PRN Shortness of Breath and/or Wheezing  LORazepam     Tablet 0.5 milliGRAM(s) Oral two times a day PRN Anxiety        DIAGNOSTIC TESTS:     < from: TTE Echo Complete w/o Contrast w/ Doppler (08.09.23 @ 11:31) >  CONCLUSIONS:     1. Moderately reduced left ventricular systolic function.   2. Mildly dilated left ventricular size.   3. Mildly dilated right ventricular size.   4. Reduced right ventricular systolic function.   5. Biatrial enlargement.   6. 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   7. Severe mitral regurgitation.   8. Moderate tricuspid regurgitation.   9. Moderate pulmonic regurgitation.  10. Pulmonary hypertension present, pulmonary artery systolic pressure is 46 mmHg.  11. No pericardial effusion.  12. Mildly dilated ascending aorta.  13. No prior echo is available for comparison.    < end of copied text >     Subjective: Pt seen and examined at bedside. Denies SOB, chest pain, lightheadedness, dizziness, palpitations, fever, chills. Patient endorses constipation. Remainder ROS otherwise negative.    Overnight Events: net negative 1.8L over last 24hrs    TELEMETRY: Afib 80-120s, occasional PVCs      VITAL SIGNS:  T(C): 36.4 (08-09-23 @ 08:39), Max: 36.8 (08-08-23 @ 18:17)  HR: 112 (08-09-23 @ 08:39) (76 - 116)  BP: 162/98 (08-09-23 @ 08:39) (132/83 - 169/107)  RR: 17 (08-09-23 @ 08:39) (17 - 30)  SpO2: 97% (08-09-23 @ 08:39) (92% - 100%)  Wt(kg): --    I&O's Summary    08 Aug 2023 07:01  -  09 Aug 2023 07:00  --------------------------------------------------------  IN: 0 mL / OUT: 1200 mL / NET: -1200 mL    09 Aug 2023 07:01  -  09 Aug 2023 12:32  --------------------------------------------------------  IN: 240 mL / OUT: 650 mL / NET: -410 mL          PHYSICAL EXAM:    General: A/ox 3, No acute Distress  Neck: Supple, - JVD  Cardiac: S1 S2, grade IV/VI systolic murmur at apex  Pulmonary: Lungs w/ bibasilar crackles, Breathing unlabored on 2L NC, No Rhonchi/Wheezing  Abdomen: Soft, Non -tender, +BS x 4 quads  Extremities: No Rashes, Pedro 1+ LE pedal edema  Neuro: A/o x 3, No focal deficits          LABS:                          11.8   6.48  )-----------( 119      ( 09 Aug 2023 05:30 )             35.9                              08-09    130<L>  |  93<L>  |  23  ----------------------------<  108<H>  4.1   |  26  |  0.95    Ca    8.4      09 Aug 2023 05:30  Phos  4.9     08-09  Mg     1.8     08-09    TPro  6.2  /  Alb  3.4  /  TBili  0.9  /  DBili  0.3  /  AST  58<H>  /  ALT  61<H>  /  AlkPhos  118  08-09    LIVER FUNCTIONS - ( 09 Aug 2023 05:30 )  Alb: 3.4 g/dL / Pro: 6.2 g/dL / ALK PHOS: 118 U/L / ALT: 61 U/L / AST: 58 U/L / GGT: x         PT/INR - ( 09 Aug 2023 05:30 )   PT: 15.1 sec;   INR: 1.34          PTT - ( 09 Aug 2023 05:30 )  PTT:41.8 sec  CAPILLARY BLOOD GLUCOSE    Allergies:  No Known Allergies    MEDICATIONS  (STANDING):  apixaban 2.5 milliGRAM(s) Oral every 12 hours  furosemide   Injectable 40 milliGRAM(s) IV Push two times a day  magnesium oxide 400 milliGRAM(s) Oral once  metoprolol succinate ER 25 milliGRAM(s) Oral daily  polyethylene glycol 3350 17 Gram(s) Oral daily  sacubitril 49 mG/valsartan 51 mG 1 Tablet(s) Oral two times a day  senna 2 Tablet(s) Oral at bedtime  ipratropium    for Nebulization 500 MICROGram(s) Nebulizer every 6 hours PRN Shortness of Breath and/or Wheezing  LORazepam     Tablet 0.5 milliGRAM(s) Oral two times a day PRN Anxiety        DIAGNOSTIC TESTS:     < from: TTE Echo Complete w/o Contrast w/ Doppler (08.09.23 @ 11:31) >  CONCLUSIONS:     1. Moderately reduced left ventricular systolic function.   2. Mildly dilated left ventricular size.   3. Mildly dilated right ventricular size.   4. Reduced right ventricular systolic function.   5. Biatrial enlargement.   6. 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   7. Severe mitral regurgitation.   8. Moderate tricuspid regurgitation.   9. Moderate pulmonic regurgitation.  10. Pulmonary hypertension present, pulmonary artery systolic pressure is 46 mmHg.  11. No pericardial effusion.  12. Mildly dilated ascending aorta.  13. No prior echo is available for comparison.    < end of copied text >     Subjective: Pt seen and examined at bedside. Denies SOB, chest pain, lightheadedness, dizziness, palpitations, fever, chills. Patient endorses constipation. Remainder ROS otherwise negative.    Overnight Events: net negative 1.8L over last 24hrs    TELEMETRY: Afib 80-120s, occasional PVCs      VITAL SIGNS:  T(C): 36.4 (08-09-23 @ 08:39), Max: 36.8 (08-08-23 @ 18:17)  HR: 112 (08-09-23 @ 08:39) (76 - 116)  BP: 162/98 (08-09-23 @ 08:39) (132/83 - 169/107)  RR: 17 (08-09-23 @ 08:39) (17 - 30)  SpO2: 97% (08-09-23 @ 08:39) (92% - 100%)  Wt(kg): --    I&O's Summary    08 Aug 2023 07:01  -  09 Aug 2023 07:00  --------------------------------------------------------  IN: 0 mL / OUT: 1200 mL / NET: -1200 mL    09 Aug 2023 07:01  -  09 Aug 2023 12:32  --------------------------------------------------------  IN: 240 mL / OUT: 650 mL / NET: -410 mL          PHYSICAL EXAM:    General: A/ox 3, No acute Distress  Neck: Supple, - JVD  Cardiac: S1 S2, grade IV/VI systolic murmur at apex  Pulmonary: Lungs w/ bibasilar crackles, Breathing unlabored on 2L NC, No Rhonchi/Wheezing  Abdomen: Soft, Non -tender, +BS x 4 quads  Extremities: No Rashes, Pedro 1+ LE pedal edema  Neuro: A/o x 3, No focal deficits          LABS:                          11.8   6.48  )-----------( 119      ( 09 Aug 2023 05:30 )             35.9                              08-09    130<L>  |  93<L>  |  23  ----------------------------<  108<H>  4.1   |  26  |  0.95    Ca    8.4      09 Aug 2023 05:30  Phos  4.9     08-09  Mg     1.8     08-09    TPro  6.2  /  Alb  3.4  /  TBili  0.9  /  DBili  0.3  /  AST  58<H>  /  ALT  61<H>  /  AlkPhos  118  08-09    LIVER FUNCTIONS - ( 09 Aug 2023 05:30 )  Alb: 3.4 g/dL / Pro: 6.2 g/dL / ALK PHOS: 118 U/L / ALT: 61 U/L / AST: 58 U/L / GGT: x         PT/INR - ( 09 Aug 2023 05:30 )   PT: 15.1 sec;   INR: 1.34          PTT - ( 09 Aug 2023 05:30 )  PTT:41.8 sec  CAPILLARY BLOOD GLUCOSE    Allergies:  No Known Allergies    MEDICATIONS  (STANDING):  apixaban 2.5 milliGRAM(s) Oral every 12 hours  furosemide   Injectable 40 milliGRAM(s) IV Push two times a day  magnesium oxide 400 milliGRAM(s) Oral once  metoprolol succinate ER 50 milliGRAM(s) Oral daily  polyethylene glycol 3350 17 Gram(s) Oral daily  sacubitril 49 mG/valsartan 51 mG 1 Tablet(s) Oral two times a day  senna 2 Tablet(s) Oral at bedtime  ipratropium    for Nebulization 500 MICROGram(s) Nebulizer every 6 hours PRN Shortness of Breath and/or Wheezing  LORazepam     Tablet 0.5 milliGRAM(s) Oral two times a day PRN Anxiety        DIAGNOSTIC TESTS:     < from: TTE Echo Complete w/o Contrast w/ Doppler (08.09.23 @ 11:31) >  CONCLUSIONS:     1. Moderately reduced left ventricular systolic function.   2. Mildly dilated left ventricular size.   3. Mildly dilated right ventricular size.   4. Reduced right ventricular systolic function.   5. Biatrial enlargement.   6. 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   7. Severe mitral regurgitation.   8. Moderate tricuspid regurgitation.   9. Moderate pulmonic regurgitation.  10. Pulmonary hypertension present, pulmonary artery systolic pressure is 46 mmHg.  11. No pericardial effusion.  12. Mildly dilated ascending aorta.  13. No prior echo is available for comparison.    < end of copied text >     Subjective: Pt seen and examined at bedside. Denies SOB, chest pain, lightheadedness, dizziness, palpitations, fever, chills. Patient endorses constipation. Remainder ROS otherwise negative.    Overnight Events: net negative 1.8L over last 24hrs    TELEMETRY: Afib 80-120s, occasional PVCs      VITAL SIGNS:  T(C): 36.4 (08-09-23 @ 08:39), Max: 36.8 (08-08-23 @ 18:17)  HR: 112 (08-09-23 @ 08:39) (76 - 116)  BP: 162/98 (08-09-23 @ 08:39) (132/83 - 169/107)  RR: 17 (08-09-23 @ 08:39) (17 - 30)  SpO2: 97% (08-09-23 @ 08:39) (92% - 100%)  Wt(kg): --    MEDICATIONS:  metoprolol succinate ER 50 milliGRAM(s) Oral daily  sacubitril 49 mG/valsartan 51 mG 1 Tablet(s) Oral two times a day  ipratropium    for Nebulization 500 MICROGram(s) Nebulizer every 6 hours PRN  LORazepam     Tablet 0.5 milliGRAM(s) Oral two times a day PRN  polyethylene glycol 3350 17 Gram(s) Oral daily  senna 2 Tablet(s) Oral at bedtime  apixaban 2.5 milliGRAM(s) Oral every 12 hours      I&O's Summary    08 Aug 2023 07:01  -  09 Aug 2023 07:00  --------------------------------------------------------  IN: 0 mL / OUT: 1200 mL / NET: -1200 mL    09 Aug 2023 07:01  -  09 Aug 2023 12:32  --------------------------------------------------------  IN: 240 mL / OUT: 650 mL / NET: -410 mL      PHYSICAL EXAM:  General: A/ox 3, No acute Distress  Neck: Supple, - JVD  Cardiac: S1 S2, grade IV/VI systolic murmur at apex  Pulmonary: Lungs w/ bibasilar crackles, Breathing unlabored on 2L NC, No Rhonchi/Wheezing  Abdomen: Soft, Non -tender, +BS x 4 quads  Extremities: No Rashes, Pedro 1+ LE pedal edema  Neuro: A/o x 3, No focal deficits          LABS:                  11.8   6.48  )-----------( 119      ( 09 Aug 2023 05:30 )             35.9                              08-09    130<L>  |  93<L>  |  23  ----------------------------<  108<H>  4.1   |  26  |  0.95    Ca    8.4      09 Aug 2023 05:30  Phos  4.9     08-09  Mg     1.8     08-09    TPro  6.2  /  Alb  3.4  /  TBili  0.9  /  DBili  0.3  /  AST  58<H>  /  ALT  61<H>  /  AlkPhos  118  08-09    LIVER FUNCTIONS - ( 09 Aug 2023 05:30 ) Subjective: Pt seen and examined at bedside. Denies SOB, chest pain, lightheadedness, dizziness, palpitations, fever, chills. Patient endorses constipation. Remainder ROS otherwise negative.    Overnight Events: net negative 1.8L over last 24hrs    TELEMETRY: Afib 80-120s, occasional PVCs      VITAL SIGNS:  T(C): 36.4 (08-09-23 @ 08:39), Max: 36.8 (08-08-23 @ 18:17)  HR: 112 (08-09-23 @ 08:39) (76 - 116)  BP: 162/98 (08-09-23 @ 08:39) (132/83 - 169/107)  RR: 17 (08-09-23 @ 08:39) (17 - 30)  SpO2: 97% (08-09-23 @ 08:39) (92% - 100%)  Wt(kg): --    MEDICATIONS:  metoprolol succinate ER 50 milliGRAM(s) Oral daily  sacubitril 49 mG/valsartan 51 mG 1 Tablet(s) Oral two times a day  ipratropium    for Nebulization 500 MICROGram(s) Nebulizer every 6 hours PRN  LORazepam     Tablet 0.5 milliGRAM(s) Oral two times a day PRN  polyethylene glycol 3350 17 Gram(s) Oral daily  senna 2 Tablet(s) Oral at bedtime  apixaban 2.5 milliGRAM(s) Oral every 12 hours      I&O's Summary    08 Aug 2023 07:01  -  09 Aug 2023 07:00  --------------------------------------------------------  IN: 0 mL / OUT: 1200 mL / NET: -1200 mL    09 Aug 2023 07:01  -  09 Aug 2023 12:32  --------------------------------------------------------  IN: 240 mL / OUT: 650 mL / NET: -410 mL      PHYSICAL EXAM:  General: A/ox 3, No acute Distress  Neck: Supple, - JVD  Cardiac: S1 S2, grade IV/VI systolic murmur at apex  Pulmonary: Lungs w/ bibasilar crackles, Breathing unlabored on 2L NC, No Rhonchi/Wheezing  Abdomen: Soft, Non -tender, +BS x 4 quads  Extremities: No Rashes, Pedro 1+ LE pedal edema  Neuro: A/o x 3, No focal deficits      LABS:                11.8   6.48  )-----------( 119      ( 09 Aug 2023 05:30 )             35.9                              08-09    130<L>  |  93<L>  |  23  ----------------------------<  108<H>  4.1   |  26  |  0.95    Ca    8.4      09 Aug 2023 05:30  Phos  4.9     08-09  Mg     1.8     08-09    TPro  6.2  /  Alb  3.4  /  TBili  0.9  /  DBili  0.3  /  AST  58<H>  /  ALT  61<H>  /  AlkPhos  118  08-09    LIVER FUNCTIONS - ( 09 Aug 2023 05:30 ) Subjective: Pt seen and examined at bedside. Denies SOB, chest pain, lightheadedness, dizziness, palpitations, fever, chills. Patient endorses constipation. Remainder ROS otherwise negative.    Overnight Events: net negative 1.8L over last 24hrs    TELEMETRY: Afib 80-120s, occasional PVCs    VITAL SIGNS:  T(C): 36.4 (08-09-23 @ 08:39), Max: 36.8 (08-08-23 @ 18:17)  HR: 112 (08-09-23 @ 08:39) (76 - 116)  BP: 162/98 (08-09-23 @ 08:39) (132/83 - 169/107)  RR: 17 (08-09-23 @ 08:39) (17 - 30)  SpO2: 97% (08-09-23 @ 08:39) (92% - 100%)  Wt(kg): --    MEDICATIONS:  metoprolol succinate ER 50 milliGRAM(s) Oral daily  sacubitril 49 mG/valsartan 51 mG 1 Tablet(s) Oral two times a day  ipratropium    for Nebulization 500 MICROGram(s) Nebulizer every 6 hours PRN  LORazepam     Tablet 0.5 milliGRAM(s) Oral two times a day PRN  polyethylene glycol 3350 17 Gram(s) Oral daily  senna 2 Tablet(s) Oral at bedtime  apixaban 2.5 milliGRAM(s) Oral every 12 hours      I&O's Summary    08 Aug 2023 07:01  -  09 Aug 2023 07:00  --------------------------------------------------------  IN: 0 mL / OUT: 1200 mL / NET: -1200 mL    09 Aug 2023 07:01  -  09 Aug 2023 12:32  --------------------------------------------------------  IN: 240 mL / OUT: 650 mL / NET: -410 mL      PHYSICAL EXAM:  General: A/ox 3, No acute Distress  Neck: Supple, - JVD  Cardiac: S1 S2, grade IV/VI systolic murmur at apex  Pulmonary: Lungs w/ bibasilar crackles, Breathing unlabored on 2L NC, No Rhonchi/Wheezing  Abdomen: Soft, Non -tender, +BS x 4 quads  Extremities: No Rashes, Pedro 1+ LE pedal edema  Neuro: A/o x 3, No focal deficits      LABS:                11.8   6.48  )-----------( 119      ( 09 Aug 2023 05:30 )             35.9                              08-09    130<L>  |  93<L>  |  23  ----------------------------<  108<H>  4.1   |  26  |  0.95    Ca    8.4      09 Aug 2023 05:30  Phos  4.9     08-09  Mg     1.8     08-09    TPro  6.2  /  Alb  3.4  /  TBili  0.9  /  DBili  0.3  /  AST  58<H>  /  ALT  61<H>  /  AlkPhos  118  08-09    LIVER FUNCTIONS - ( 09 Aug 2023 05:30 )

## 2023-08-10 NOTE — PROGRESS NOTE ADULT - PROBLEM SELECTOR PLAN 1
Recently diagnosed with HFrEF (EF 40%) by outpt cardiologist Dr Jerome Lopez 2 weeks ago, and started on Lasix 40mg daily, Entresto 24/26mg BID, Toprol 50mg qd.  -Etiology possibly 2/2 Afib RVR, severe MR, vs ischemic in nature  -BNP 9310, hs Trop T negative x1. AST//82 (improving)  -CXR w/ pulm vasc congestion  -EKG Afib 120s, Q waves V1-V3  -Outpt Echo 7/27/23: EF 40%, severely dilated LA, mildly dilated RA, mild conc LVH, G2DD, normal function AVR, mod-severe MR  -ECHO 8/9/23 @ [facility]: EF 35-40%, global hypokinesis, mildly dilated LV/RV, MATI, reduced RVSF, bioprosthetic AVR apparent normal function, severe MR, mod TR/DE.  -c/w Lasix 40mg IV BID, net negative 1.6L so far  -Wean off O2 as tolerated, currently on 2L NC  -Increase home Entresto 24/26mg to 49/51mg BID  -c/w Toprol 25mg daily, will add Lopressor 25mg x1 this evening. Resume home dose Toprol 50mg qd in AM  -HF core measure, daily weight. strict I&Os Recently diagnosed with HFrEF (EF 40%) by outpt cardiologist Dr Jerome Lopez 2 weeks ago, and started on Lasix 40mg daily, Entresto 24/26mg BID, Toprol 50mg qd.  -Etiology possibly 2/2 Afib RVR, severe MR, vs ischemic in nature  -BNP 9310, hs Trop T negative x1. AST//82 (improving)  -CXR w/ pulm vasc congestion  -EKG Afib 120s, Q waves V1-V3  -Outpt Echo 7/27/23: EF 40%, severely dilated LA, mildly dilated RA, mild conc LVH, G2DD, normal function AVR, mod-severe MR  -ECHO 8/9/23 @ [facility]: EF 35-40%, global hypokinesis, mildly dilated LV/RV, MATI, reduced RVSF, bioprosthetic AVR apparent normal function, severe MR, mod TR/DE.  -Wean off O2 as tolerated, currently on 2L NC  -Increase home Entresto 24/26mg to 49/51mg BID  -c/w Toprol 25mg daily, will add Lopressor 25mg x1 this evening. Resume home dose Toprol 50mg qd in AM  -HF core measure, daily weight. strict I&Os  -Start Torsemide 40 mg BID Recently diagnosed with HFrEF (EF 40%) by outpt cardiologist Dr Jerome Lopez 2 weeks ago, and started on Lasix 40mg daily, Entresto 24/26mg BID, Toprol 50mg qd.  -Etiology possibly 2/2 Afib RVR, severe MR, vs ischemic in nature  -BNP 9310, hs Trop T negative x1. AST//82 (improving)  -CXR w/ pulm vasc congestion  -EKG Afib 120s, Q waves V1-V3  -Outpt Echo 7/27/23: EF 40%, severely dilated LA, mildly dilated RA, mild conc LVH, G2DD, normal function AVR, mod-severe MR  -ECHO 8/9/23 @ [facility]: EF 35-40%, global hypokinesis, mildly dilated LV/RV, MATI, reduced RVSF, bioprosthetic AVR apparent normal function, severe MR, mod TR/SD.  -Wean off O2 as tolerated, currently on 2L NC  -c/w 49/51mg BID & Metoprolol 50 mg QD  -HF core measure, daily weight. strict I&Os  -Start Torsemide 40 mg BID Recently diagnosed with HFrEF (EF 40%) by outpt cardiologist Dr Jerome Lopez 2 weeks ago, and started on Lasix 40mg daily, Entresto 24/26mg BID, Toprol 50mg qd.  -Etiology possibly 2/2 Afib RVR, severe MR, vs ischemic in nature  -BNP 9310, hs Trop T negative x1. AST//82 (improving)  -CXR w/ pulm vasc congestion  -EKG Afib 120s, Q waves V1-V3  -Outpt Echo 7/27/23: EF 40%, severely dilated LA, mildly dilated RA, mild conc LVH, G2DD, normal function AVR, mod-severe MR  -ECHO 8/9/23 @ [facility]: EF 35-40%, global hypokinesis, mildly dilated LV/RV, MATI, reduced RVSF, bioprosthetic AVR apparent normal function, severe MR, mod TR/FL.  -Wean off O2 as tolerated, currently on 2L NC  -c/w 49/51mg BID & Metoprolol 50 mg QD  -HF core measure, daily weight. strict I&Os  -BUN elevated at 32 and GFR 43 likely due to diuresis, start Torsemide 40 mg IV BID and d/c Furosemide 40 mg IV BID Recently diagnosed with HFrEF (EF 40%) by outpt cardiologist Dr Jerome Lopez 2 weeks ago, and started on Lasix 40mg daily, Entresto 24/26mg BID, Toprol 50mg qd.  -Etiology possibly 2/2 Afib RVR, severe MR, vs ischemic in nature  -BNP 9310, hs Trop T negative x1. AST//82 (improving)  -CXR w/ pulm vasc congestion  -EKG Afib 120s, Q waves V1-V3  -ECHO 8/9/23 @ [facility]: EF 35-40%, global hypokinesis, mildly dilated LV/RV, MATI, reduced RVSF, bioprosthetic AVR apparent normal function, severe MR, mod TR/KS.  -DIURESIS: s/p lasix 40 mg IVP BID, near euvolemia/elevated BUN and GFR 8/10/23, will switch to _________ PO 8/11/23  -GDMT: Entresto 49/51 mg BID, Toprol XL 50 mg daily   -HF core measure, daily weight. strict I&Os Recently diagnosed with HFrEF (EF 40%) by outpt cardiologist Dr Jerome Lopez 2 weeks ago, and started on Lasix 40mg daily, Entresto 24/26mg BID, Toprol 50mg qd.  -Etiology possibly 2/2 Afib RVR, severe MR, vs ischemic in nature  -BNP 9310, hs Trop T negative x1. AST//82 (improving)  -CXR w/ pulm vasc congestion  -EKG Afib 120s, Q waves V1-V3  -Bedside ECHO 8/9/23 @ [facility]: EF 35-40%, global hypokinesis, mildly dilated LV/RV, MATI, reduced RVSF, bioprosthetic AVR apparent normal function, severe MR, mod TR/DC.  -TTE (08/10/23) @ [facility]: EF 30-35%, global hypokinesis, dilated LA, reduced RVSF. MV is mildly thickened, severe MR, severe non-mobile plaque seen in descending aorta and aortic arch.   -DIURESIS: s/p lasix 40 mg IVP BID, near euvolemia/elevated BUN and GFR 8/10/23, will switch to _________ PO 8/11/23  -GDMT: Entresto 49/51 mg BID, Toprol XL 50 mg daily   -HF core measure, daily weight. strict I&Os Recently diagnosed with HFrEF (EF 40%) by outpt cardiologist Dr Jerome Lopez 2 weeks ago, and started on Lasix 40mg daily, Entresto 24/26mg BID, Toprol 50mg qd.  -Etiology possibly 2/2 Afib RVR, severe MR, vs ischemic in nature  -BNP 9310, hs Trop T negative x1. AST//82 (improving)  -CXR w/ pulm vasc congestion  -EKG Afib 120s, Q waves V1-V3  -Bedside ECHO 8/9/23 @ [facility]: EF 35-40%, global hypokinesis, mildly dilated LV/RV, MATI, reduced RVSF, bioprosthetic AVR apparent normal function, severe MR, mod TR/HI.  -TTE (08/10/23) @ [facility]: EF 30-35%, global hypokinesis, dilated LA, reduced RVSF. MV is mildly thickened, severe MR, severe non-mobile plaque seen in descending aorta and aortic arch.   -DIURESIS: s/p lasix 40 mg IVP BID, near euvolemia/elevated BUN and GFR 8/10/23, will switch to Torsemide 20 mg PO 8/11/23  -GDMT: Entresto 49/51 mg BID, Toprol XL 50 mg daily   -HF core measure, daily weight. strict I&Os

## 2023-08-10 NOTE — DISCHARGE NOTE PROVIDER - HOSPITAL COURSE
89 y/o Cantonese speaking female, ambulates with walker, has HHA 5 days a week 6hrs a day with PMHx of HTN, pAtrial Fibrillation (on Eliquis 2.5mg), recently diagnosed with HFrEF (EF 40%), severe MR, Medtronic PPM (s/p gen change 2022 @ [facility]), bioprosthetic AVR 2009 @ [facility], presented to [facility] ER  08/08/23, accompanied by daughter, with PEREZ and b/l UE & LE swelling and was admitted to the Cardiac Tele Unit for acute on chronic HFrEF exacerbation and Atrial Flutter @ 123 bpm s/p diuresis by Lasix 40 mg IV BID and PPM interrogation revealed she has been in A-fib since 07/29/23. Dr. Lopez, the patients outpatient Cardiologist, discussed with the patient and her family the importance of acquiring a MISAEL for both Cardioversion for A-fib as well as evaluation of severe MR for possible Mitraclip. MISAEL (08/10/23) revealed EF 30-35% w/ global hypokinesis, dilated LA, reduced RVSF, severe MR, bioprosthetic valve noted in aortic position no AR, severe non-mobile plaque seen in descending aorta and aortic arch. Cardioversion done on 08/10/23 was only momentarily successful and patient returned back into A-fib. Patient was loaded with Digoxin 250 mcg on 08/10/23 and 125 mcg AM of 08/11/23 and to take Dig every other day pending normal Digoxin levels on 08/12/23.           89 y/o Cantonese speaking female, ambulates with walker, has HHA 5 days a week 6hrs a day with PMHx of HTN, pAtrial Fibrillation (on Eliquis 2.5mg), recently diagnosed with HFrEF (EF 40%), severe MR, Medtronic PPM (s/p gen change 2022 @ [facility]), bioprosthetic AVR 2009 @ [facility], presented to [facility] ER  08/08/23, accompanied by daughter, with PEREZ and b/l UE & LE swelling and was admitted to the Cardiac Tele Unit for acute on chronic HFrEF exacerbation and Atrial Flutter @ 123 bpm s/p diuresis by Lasix 40 mg IV BID and PPM interrogation revealed she has been in A-fib since 07/29/23. Dr. Lopez, the patients outpatient Cardiologist, discussed with the patient and her family the importance of acquiring a MISAEL for both Cardioversion for A-fib as well as evaluation of severe MR for possible Mitraclip. MISAEL (08/10/23) revealed EF 30-35% w/ global hypokinesis, dilated LA, reduced RVSF, severe MR, bioprosthetic valve noted in aortic position no AR, severe non-mobile plaque seen in descending aorta and aortic arch. Cardioversion done on 08/10/23 was only momentarily successful and patient returned back into A-fib. Patient was loaded with Digoxin 250 mcg on 08/10/23 and 125 mcg AM of 08/11/23 and to take Dig every other day pending normal Digoxin levels on 08/12/23.     No significant events on telemetry overnight. Repeat EKG without ischemic changes. Patient has been medically cleared for discharge as per  ________. Patient has been given appropriate discharge instructions including medication regimen, access site management and follow up. Medications that patient needs refills on have been e-prescribed to preferred pharmacy.     Temp HR BP RR SpO2  Gen: NAD, A&O x3  Cards: RRR, clear S1 and S2 without murmur  Pulm: CTA B/L without w/r/r  Abd: soft, NT  Ext: no LE edema or ulcerations B/L          89 y/o Cantonese speaking female, ambulates with walker, has HHA 5 days a week 6hrs a day with PMHx of HTN, pAtrial Fibrillation (on Eliquis 2.5mg), recently diagnosed with HFrEF (EF 40%), severe MR, Medtronic PPM (s/p gen change 2022 @ [facility]), bioprosthetic AVR 2009 @ [facility], presented to [facility] ER  08/08/23, accompanied by daughter, with PEREZ and b/l UE & LE swelling and was admitted to the Cardiac Tele Unit for acute on chronic HFrEF exacerbation and Atrial Flutter @ 123 bpm s/p diuresis by Lasix 40 mg IV BID and PPM interrogation revealed she has been in A-fib since 07/29/23. Dr. Lopez, the patients outpatient Cardiologist, discussed with the patient and her family the importance of acquiring a MISAEL for both Cardioversion for A-fib as well as evaluation of severe MR for possible Mitraclip. MISAEL (08/10/23) revealed EF 30-35% w/ global hypokinesis, dilated LA, reduced RVSF, severe MR, bioprosthetic valve noted in aortic position no AR, severe non-mobile plaque seen in descending aorta and aortic arch. Cardioversion done on 08/10/23 was only momentarily successful and patient returned back into A-fib. Patient was loaded with Digoxin 250 mcg on 08/10/23 and 125 mcg AM of 08/11/23 and to take Dig every other day (Next dose 08/13/23). Patient continues to fluctuate between NSR and A-fib in 70s post Digoxin 125 mcg. EP does not believe an AV ablation is necessary at this time and to continue Digoxin therapy.    Patient noted to have asymptomatic 3 NSVT night of 8/10/22. Repeat EKG without ischemic changes. Patient has been medically cleared for discharge as per Dr. Richardson in coordination with Dr. Wolff. Medications that patient needs refills on have been e-prescribed to preferred pharmacy.     Temp HR BP RR SpO2  Gen: NAD, A&O x3  Cards: RRR, clear S1 and S2 without murmur  Pulm: CTA B/L without w/r/r  Abd: soft, NT  Ext: no LE edema or ulcerations B/L          89 y/o Cantonese speaking female, ambulates with walker, has HHA 5 days a week 6hrs a day with PMHx of HTN, pAtrial Fibrillation (on Eliquis 2.5mg), recently diagnosed with HFrEF (EF 40%), severe MR, Medtronic PPM (s/p gen change 2022 @ [facility]), bioprosthetic AVR 2009 @ [facility], presented to [facility] ER  08/08/23, accompanied by daughter, with PEREZ and b/l UE & LE swelling and was admitted to the Cardiac Tele Unit for acute on chronic HFrEF exacerbation and Atrial Flutter @ 123 bpm s/p diuresis by Lasix 40 mg IV BID and PPM interrogation revealed she has been in A-fib since 07/29/23. Dr. Lopez, the patients outpatient Cardiologist, discussed with the patient and her family the importance of acquiring a MISAEL for both Cardioversion for A-fib as well as evaluation of severe MR for possible Mitraclip. MISAEL (08/10/23) revealed EF 30-35% w/ global hypokinesis, dilated LA, reduced RVSF, severe MR, bioprosthetic valve noted in aortic position no AR, severe non-mobile plaque seen in descending aorta and aortic arch. Cardioversion done on 08/10/23 was only momentarily successful and patient returned back into A-fib. Patient was loaded with Digoxin 250 mcg on 08/10/23 and 125 mcg AM of 08/11/23 and to take Dig every other day (Next dose 08/13/23). Dig level of 1.2 on 08/11/23.  Patient continues to fluctuate between NSR and A-fib in 70s post Digoxin 125 mcg. EP considered an AVN ablation and BiV upgrade and discussed with patient daughter but does not believe is necessary at this time and to continue Digoxin therapy. Lasix 40 mg IV twice daily was converted to Torsemide 20 mg twice daily.     And to be discharged with Eliquis 2.5 mg twice daily, Digoxin 125 mcg every other day, Toprol 50 mg daily, Entresto 49/51 daily, Lipitor 10 mg daily and Torsemide 20 mg BID.   Patient scheduled to see her Dr. Lopez on 08/13/23 and to check creatinine level (1.20 on discharge after 2 days of IV diuresis) creatinine level to be check outpatient at appointment as well as evaluation for possible Mitraclip in the future     Patient noted to have asymptomatic 3 NSVT night of 8/10/22. Repeat EKG without ischemic changes. Patient has been medically cleared for discharge as per Dr. Richardson in coordination with Dr. Wolff. Medications that patient needs refills on have been e-prescribed to preferred pharmacy.     Temp 97.3F HR 71 /71 RR 18 SpO2 92 Room Air  Gen: NAD, A&O x3  Cards: RRR, clear S1 and S2 without murmur  Pulm: CTA B/L without w/r/r  Abd: soft, NT  Ext: no LE edema or ulcerations B/L          89 y/o Cantonese speaking female, ambulates with walker, has HHA 5 days a week 6hrs a day with PMHx of HTN, pAtrial Fibrillation (on Eliquis 2.5mg), recently diagnosed with HFrEF (EF 40%), severe MR, Medtronic PPM (s/p gen change 2022 @ [facility]), bioprosthetic AVR 2009 @ [facility], presented to [facility] ER  08/08/23, accompanied by daughter, with PEREZ and b/l UE & LE swelling and was admitted to the Cardiac Tele Unit for acute on chronic HFrEF exacerbation and Atrial Flutter @ 123 bpm s/p diuresis by Lasix 40 mg IV BID and PPM interrogation revealed she has been in A-fib since 07/29/23. Dr. Lopez, the patients outpatient Cardiologist, discussed with the patient and her family the importance of acquiring a MISAEL for both Cardioversion for A-fib as well as evaluation of severe MR for possible Mitraclip. MISAEL (08/10/23) revealed EF 30-35% w/ global hypokinesis, dilated LA, reduced RVSF, severe MR, bioprosthetic valve noted in aortic position no AR, severe non-mobile plaque seen in descending aorta and aortic arch. Cardioversion done on 08/10/23 was only momentarily successful and patient returned back into A-fib. Patient was loaded with Digoxin 250 mcg on 08/10/23 and 125 mcg AM of 08/11/23 and to take Dig every other day (Next dose 08/13/23). Dig level of 1.2 on 08/11/23.  Patient continues to fluctuate between NSR and A-fib in 70s post Digoxin 125 mcg. EP considered an AVN ablation and BiV upgrade and discussed with patient daughter but does not believe is necessary at this time and to continue Digoxin therapy. Lasix 40 mg IV twice daily was converted to Torsemide 20 mg twice daily.     And to be discharged with Eliquis 2.5 mg twice daily, Digoxin 125 mcg every other day, Toprol 50 mg daily, Entresto 49/51 daily, Lipitor 10 mg daily and Torsemide 20 mg BID.   Patient scheduled to see her Dr. Lopez on 08/13/23 and to check creatinine level (1.20 on discharge after 2 days of IV diuresis) as well as evaluation for possible Mitraclip in the future for Mitral Regurgitation.      Patient noted to have asymptomatic 3 NSVT night of 8/10/22. Repeat EKG without ischemic changes. Patient has been medically cleared for discharge as per Dr. Richardson in coordination with Dr. Wolff. Medications that patient needs refills on have been e-prescribed to preferred pharmacy.     Temp 97.3F HR 71 /74 RR 18 SpO2 92 Room Air  Gen: NAD, A&O x3  Cards: RRR, clear S1 and S2 without murmur  Pulm: CTA B/L without w/r/r  Abd: soft, NT  Ext: no LE edema or ulcerations B/L          89 y/o Cantonese speaking female, ambulates with walker, has HHA 5 days a week 6hrs a day with PMHx of HTN, pAtrial Fibrillation (on Eliquis 2.5mg), recently diagnosed with HFrEF (EF 40%), severe MR, Medtronic PPM (s/p gen change 2022 @ [facility]), bioprosthetic AVR 2009 @ [facility], presented to [facility] ER  08/08/23, accompanied by daughter, with PEREZ and b/l UE & LE swelling and was admitted to the Cardiac Tele Unit for acute on chronic HFrEF exacerbation and Atrial Flutter @ 123 bpm s/p diuresis by Lasix 40 mg IV BID and PPM interrogation revealed she has been in A-fib since 07/29/23. Dr. Lopez, the patients outpatient Cardiologist, discussed with the patient and her family the importance of acquiring a MISAEL for both Cardioversion for A-fib as well as evaluation of severe MR for possible Mitraclip. MISAEL (08/10/23) revealed EF 30-35% w/ global hypokinesis, dilated LA, reduced RVSF, severe MR, bioprosthetic valve noted in aortic position no AR, severe non-mobile plaque seen in descending aorta and aortic arch. Cardioversion done on 08/10/23 was only momentarily successful and patient returned back into A-fib. Patient was loaded with Digoxin 250 mcg on 08/10/23 and 125 mcg AM of 08/11/23. Dig level of 1.2 on 08/11/23. Patient seen by EP, recommended to change to Amiodarone 200 mg daily, with first dose 8/11/2023.   Patient continues to fluctuate between NSR and A-fib in 70s post Digoxin 125 mcg. EP considered an AVN ablation and BiV upgrade and discussed with patient daughter but does not believe is necessary at this time and to continue Amiodarone therapy. Lasix 40 mg IV twice daily was converted to Torsemide 20 mg twice daily.     And to be discharged with Eliquis 2.5 mg twice daily, Amiodarone 200 mg daily, Toprol 50 mg daily, Entresto 49mg/51mg daily, Lipitor 10 mg daily and Torsemide 20 mg BID.   Patient scheduled to see her Dr. Lopez on 08/13/23 and to check creatinine level (1.20 on discharge after 2 days of IV diuresis) as well as evaluation for possible Mitraclip in the future for Mitral Regurgitation.      Patient noted to have asymptomatic 3 NSVT night of 8/10/22. Repeat EKG without ischemic changes. Patient has been medically cleared for discharge as per Dr. Richardson in coordination with Dr. Wolff. Medications that patient needs refills on have been e-prescribed to preferred pharmacy.     Temp 97.3F HR 71 /74 RR 18 SpO2 92 Room Air  Gen: NAD, A&O x3  Cards: RRR, clear S1 and S2 without murmur  Pulm: CTA B/L without w/r/r  Abd: soft, NT  Ext: no LE edema or ulcerations B/L

## 2023-08-10 NOTE — PROGRESS NOTE ADULT - PROBLEM SELECTOR PLAN 2
Presented in Afib 120s, currently Afib 80-120s on tele.  -EP interrogated Medtronic PPM, noting increased AF burden. Most recent episode ongoing since 7/29. Overall AF burden since last interrogation (July 2022) 13%.   -c/w Toprol 25mg daily, will add Lopressor 25mg x1 this evening. Resume home dose Toprol 50mg qd in AM  -c/w Eliquis 2.5mg BID (age 89, Wt 53kg), initiated by Dr Lopez on 7/27  -MISAEL (______) and Cardioversion for today. Presented in Afib 120s, currently Afib 80-120s on tele.  -EP interrogated Medtronic PPM, noting increased AF burden. Most recent episode ongoing since 7/29. Overall AF burden since last interrogation (July 2022) 13%.   -c/w Toprol 50mg qd in AM  -c/w Eliquis 2.5mg BID (age 89, Wt 53kg), initiated by Dr Lopez on 7/27  -MISAEL (______) and Cardioversion for today. Presented in Afib 120s, currently Afib 80-120s on tele.  -EP interrogated Medtronic PPM, noting increased AF burden. Most recent episode ongoing since 7/29. Overall AF burden since last interrogation (July 2022) 13%.   -c/w Toprol 50mg qd in AM  -c/w Eliquis 2.5mg BID (age 89, Wt 53kg), initiated by Dr Lopez on 7/27  -TTE (08/10/23): EF 30-35% w/ global hypokinesis, dilated LA, reduced RVSF. MV is mildly thickened, severe MR, severe non-mobile plaque seen in descending aorta and aortic arch, unsuccessful Cardioversion Presented in Afib 120s, currently Afib 80-120s on tele.  -EP interrogated Medtronic PPM, noting increased AF burden. Most recent episode ongoing since 7/29. Overall AF burden since last interrogation (July 2022) 13%.   -c/w Toprol 50mg qd in AM  -c/w Eliquis 2.5mg BID (age 89, Wt 53kg), initiated by Dr Lopez on 7/27  -TTE (08/10/23): EF 30-35% w/ global hypokinesis, dilated LA, reduced RVSF. MV is mildly thickened, severe MR, severe non-mobile plaque seen in descending aorta and aortic arch  -Cardioversion only momentarily successful, patient converted back into Afib  -Digoxin 250 mcg x 1 on 08/10/23, 125 mcg x 1 on AM 08/11/23 (to be done every other day starting tomorrow)  -Dig level to be measured on Saturday

## 2023-08-10 NOTE — DISCHARGE NOTE PROVIDER - CARE PROVIDER_API CALL
Jerome Lopez  Cardiology  [address] [place] [address] [address]  [place] [address]  Phone: [phone]  Fax: [phone]  Follow Up Time:     lEias Laura  Cardiac Electrophysiology  [address] [address]  Phone: [phone]  Fax: [phone]  Follow Up Time:    Jerome Lopez  Cardiology  [address] [place] [address] [address]  [place] [address]  Phone: ()-  Fax: ()-  Scheduled Appointment: 08/13/2023 04:00 PM    Elias Laura  Cardiac Electrophysiology  [address] [address]  Phone: [phone]  Fax: [phone]  Follow Up Time: 1 week

## 2023-08-10 NOTE — DISCHARGE NOTE PROVIDER - NSDCMRMEDTOKEN_GEN_ALL_CORE_FT
Eliquis 2.5 mg oral tablet: 1 orally 2 times a day  Entresto 24 mg-26 mg oral tablet: 1 orally 2 times a day  Lasix 40 mg oral tablet: 1 orally once a day  LORazepam 1 mg oral tablet: 1 orally 2 times a day as needed for  anxiety  metoprolol succinate 50 mg oral tablet, extended release: 1 orally once a day   digoxin 125 mcg (0.125 mg) oral tablet: 1 tab(s) orally every other day please take 1 tab every OTHER day  Eliquis 2.5 mg oral tablet: 1 tab(s) orally 2 times a day  Lipitor 10 mg oral tablet: 1 tab(s) orally once a day  LORazepam 1 mg oral tablet: 1 orally 2 times a day as needed for  anxiety  metoprolol succinate 50 mg oral tablet, extended release: 1 tab(s) orally once a day  sacubitril-valsartan 49 mg-51 mg oral tablet: 1 tab(s) orally 2 times a day  torsemide 20 mg oral tablet: 1 tab(s) orally 2 times a day   amiodarone 200 mg oral tablet: 1 tab(s) orally once a day  Eliquis 2.5 mg oral tablet: 1 tab(s) orally 2 times a day  Lipitor 10 mg oral tablet: 1 tab(s) orally once a day  LORazepam 1 mg oral tablet: 1 orally 2 times a day as needed for  anxiety  metoprolol succinate 50 mg oral tablet, extended release: 1 tab(s) orally once a day  sacubitril-valsartan 49 mg-51 mg oral tablet: 1 tab(s) orally 2 times a day  torsemide 20 mg oral tablet: 1 tab(s) orally 2 times a day

## 2023-08-10 NOTE — PROGRESS NOTE ADULT - NS ATTEND AMEND GEN_ALL_CORE FT
Patient is a 89 y/o Cantonese speaking Female, with Pmhx of Recently Diagnosed HFrEF by outpatient Cardiologist Dr Lopez, Valvular heart disease, Bioprosthetic AVR (2009 [facility]), moderate to severe MR, Moderate TR, Moderate UT, paroxysmal Afib on Eliquis 2.5mg s/p Medtronic PPM (gen change 2021 @[facility]), who presented accompanied by daughter c/o PEREZ and b/l UE/LE swelling x 1 week found to be in acute Heart failure exacerbation, now clinically improved with diuresis.    Review of Studies  - Echo 08/09/23: Moderately reduced left ventricular systolic function. Mildly dilated left ventricular size. Mildly dilated right ventricular size. Reduced right ventricular systolic function. Biatrial enlargement. 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 Severe mitral regurgitation. Moderate tricuspid regurgitation. Moderate pulmonic regurgitation. Pulmonary hypertension present, pulmonary artery systolic pressure is 46 mmHg. No pericardial effusion.12. Mildly dilated ascending aorta.  - PPM interrogation 08/09/23: Underlying Rhythm:  AF with RVR. not pacemaker dependent. AP 22%  <1%. Patient with increased AF burden. Most recent episode ongoing since July 29th. Overall AF burden since last interrogation (July 2022) 13%.     Physical Exam: Irregular RR; marked Holosystolic murmur heard throughout the pericardium. Fine bibasilar crackles. No LE edema    - Acute Systolic Heart Failure: Admitted with decompensated HF with signs of hepatic congestive hepatopathy, clinically improving on 40 IV lasix BID. Has diuresed closed to 2 L thus far, net neg 1.8 L. Daughter at bedside reports significant improvement. Anticipate Transition to PO diuretic tomorrow after am IV dose. SBP in the 140's-160's as patient near euvolemic will increase Entresto to 49/51 mg PO BID with strict holding parameters. Continue Toprol 25 mg po daily with plan to increase back to home dose of 50 mg daily starting 8/10.   -Valvular Cardiomyopathy: Severe Mitral Regurgitation. Moderate tricuspid regurgitation. Moderate pulmonic regurgitation. Bioprosthetic AV normal functioning, no paravalvular leak. Will discuss with patient's primary Cardiologist overall GOC. Patient might be a candidate for Mitral Clip. In the interim will aim for euvolemia with diuresis and afterload reduction with Entresto. Given Bioprosthetic AV patient would benefit from single Antiplatelet therapy with ASA 81 mg po daily if low overall bleeding risk (Given concurrent NOAC use)  - Afib s/p PPM:  Underlying Rhythm:  AF with RVR. not pacemaker dependent. AP 22%  <1%. Patient with increased AF burden. Most recent episode ongoing since July 29th. Overall AF burden since last interrogation (July 2022) 13%. Plan to increase Toprol to 50 mg Po daily starting tomorrow. As she is near euvolemia and well compensated, can dose additional 25 mg po lopressor x1 tonight. Continue with Eliquis 2.5 mg PO BID. Will discuss with Dr Lopez plan for possible MISAEL/DCCV. MISAEL would also evaluate MR further, if aligns with family's GOC  - Dispo: Home with Home HHA service. Anticipate DC on Friday 8/11
Patient is a 89 y/o Cantonese speaking Female, with Pmhx of Recently Diagnosed HFrEF by outpatient Cardiologist Dr Lopez, Valvular heart disease, Bioprosthetic AVR (2009 [facility]), moderate to severe MR, Moderate TR, Moderate NE, paroxysmal Afib on Eliquis 2.5mg s/p Medtronic PPM (gen change 2021 @[facility]), who presented accompanied by daughter c/o PEREZ and b/l UE/LE swelling x 1 week found to be in acute Heart failure exacerbation, now clinically improved with diuresis.    Review of Studies  - Echo 08/09/23: Moderately reduced left ventricular systolic function. Mildly dilated left ventricular size. Mildly dilated right ventricular size. Reduced right ventricular systolic function. Biatrial enlargement. 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 Severe mitral regurgitation. Moderate tricuspid regurgitation. Moderate pulmonic regurgitation. Pulmonary hypertension present, pulmonary artery systolic pressure is 46 mmHg. No pericardial effusion.12. Mildly dilated ascending aorta.  - PPM interrogation 08/09/23: Underlying Rhythm:  AF with RVR. not pacemaker dependent. AP 22%  <1%. Patient with increased AF burden. Most recent episode ongoing since July 29th. Overall AF burden since last interrogation (July 2022) 13%.     Physical Exam: Irregular RR; marked Holosystolic murmur heard throughout the pericardium. CTABL. No LE edema    - Acute Systolic Heart Failure: Admitted with decompensated HF with signs of hepatic congestive hepatopathy, clinically improving on 40 IV lasix BID x 2 days, s/p 40 iv this am.Will switch to Torsemide 20 mg PO daily starting 8/11 am. Cont with Entresto to 49/51 mg PO BID with strict holding parameters. Continue Toprol  50 mg daily starting   -Valvular Cardiomyopathy: Severe Mitral Regurgitation. Moderate tricuspid regurgitation. Moderate pulmonic regurgitation. Bioprosthetic AV normal functioning, no paravalvular leak.  Patient might be a candidate for Mitral Clip discussed with Structural Heart team Dr Mosher. Plan for MISAEL today for MV anatomy assessment and Cardioversion. Given Bioprosthetic AV patient would benefit from single Antiplatelet therapy with ASA 81 mg po daily if low overall bleeding risk (Given concurrent NOAC use)  - Afib s/p PPM:  Underlying Rhythm:  AF with RVR. not pacemaker dependent. AP 22%  <1%. Patient with increased AF burden. Most recent episode ongoing since July 29th. Overall AF burden since last interrogation (July 2022) 13%. Cont with Toprol to 50 mg Po daily . Continue with Eliquis 2.5 mg PO BID. S/p MISAEL/DCCV today with brief return to NSR in patient converted back to Afib 30 mns later.  - Dispo: Home with Home HHA service. Anticipate DC on Friday 8/11 if no plan for structural intervention this admission

## 2023-08-10 NOTE — PROGRESS NOTE ADULT - PROBLEM SELECTOR PLAN 4
Hypertensive -160s/90-100s  -Increased Entresto to 49/51mg BID  -Increase back to home dose Metoprolol 50mg qd    #Hyponatremia  Na 126 on arrival. Likely hypervolemic hyponatremia  - Medicine team following  - Improving Na 130 (8/9)   - 1L fluid restriction   - F/u Serum osm, TSH, urine osm, urine sodium Hypertensive -160s/90-100s  -c/w Entresto to 49/51mg BID  -c/w home dose Metoprolol 50mg qd    #Hyponatremia  Na 126 on arrival. Likely hypervolemic hyponatremia  - Medicine team following  - Improving Na 133 (8/10)   - 1L fluid restriction   - F/u Serum osm, TSH, urine osm, urine sodium Hypertensive -160s/90-100s  -c/w Entresto to 49/51mg BID  -c/w home dose Metoprolol 50mg qd    #Hyponatremia  Na 126 on arrival. Likely hypervolemic hyponatremia  - Medicine team following  - Improving Na 133 (8/10)   - 1L fluid restriction

## 2023-08-10 NOTE — PROGRESS NOTE ADULT - SUBJECTIVE AND OBJECTIVE BOX
Patient is a 89y old  Female who presents with a chief complaint of Acute on Chronic CHF Exacerbation (EF unknown) (09 Aug 2023 14:20)    INTERVAL EVENTS: NAEON    SUBJECTIVE:  Patient was seen and examined at bedside. Resting supine in bed on NC2L with SpO2 100%. AAOx3, denies SOB, CP, palpitation,etc. AFib rate controlled on tele.    Review of systems: No fever, chills, dizziness, HA, Changes in vision, CP, dyspnea, nausea or vomiting, dysuria, changes in bowel movements, LE edema. Rest of 12 point Review of systems negative unless otherwise documented elsewhere in note.     Diet, NPO:   Except Medications  With Ice Chips/Sips of Water (08-10-23 @ 00:03) [Active]  Diet, NPO after Midnight:      NPO Start Date: 09-Aug-2023,   NPO Start Time: 23:59  Except Medications  With Ice Chips/Sips of Water (08-09-23 @ 13:17) [Active]      MEDICATIONS:  MEDICATIONS  (STANDING):  apixaban 2.5 milliGRAM(s) Oral every 12 hours  furosemide   Injectable 40 milliGRAM(s) IV Push two times a day  metoprolol succinate ER 50 milliGRAM(s) Oral daily  polyethylene glycol 3350 17 Gram(s) Oral daily  sacubitril 49 mG/valsartan 51 mG 1 Tablet(s) Oral two times a day  senna 2 Tablet(s) Oral at bedtime    MEDICATIONS  (PRN):  ipratropium    for Nebulization 500 MICROGram(s) Nebulizer every 6 hours PRN Shortness of Breath and/or Wheezing  LORazepam     Tablet 0.5 milliGRAM(s) Oral two times a day PRN Anxiety      Allergies    No Known Allergies    Intolerances        OBJECTIVE:  Vital Signs Last 24 Hrs  T(C): 36.6 (10 Aug 2023 05:09), Max: 36.7 (09 Aug 2023 20:21)  T(F): 97.9 (10 Aug 2023 05:09), Max: 98 (09 Aug 2023 20:21)  HR: 96 (10 Aug 2023 05:09) (94 - 112)  BP: 131/82 (10 Aug 2023 05:09) (116/79 - 197/88)  BP(mean): 102 (10 Aug 2023 05:09) (94 - 102)  RR: 18 (10 Aug 2023 05:09) (17 - 19)  SpO2: 100% (10 Aug 2023 05:09) (96% - 100%)    Parameters below as of 10 Aug 2023 05:09  Patient On (Oxygen Delivery Method): nasal cannula  O2 Flow (L/min): 2    I&O's Summary    09 Aug 2023 07:01  -  10 Aug 2023 07:00  --------------------------------------------------------  IN: 1600 mL / OUT: 2075 mL / NET: -475 mL        PHYSICAL EXAM:  Gen: Reclining in bed at time of exam, appears stated age  HEENT: NCAT, MMM, clear OP  Neck: supple, trachea at midline  CV: RRR, +S1/S2  Pulm: adequate respiratory effort, no increase in work of breathing, CTA b/l   Abd: soft, NTND  Skin: warm and dry,   Ext: WWP, ankle edema resolved   Neuro: AOx3, no gross focal neurological deficits  Psych: affect and behavior appropriate, pleasant at time of interview    LABS:                        11.8   6.48  )-----------( 119      ( 09 Aug 2023 05:30 )             35.9     08-09    130<L>  |  93<L>  |  23  ----------------------------<  108<H>  4.1   |  26  |  0.95    Ca    8.4      09 Aug 2023 05:30  Phos  4.9     08-09  Mg     1.8     08-09    TPro  6.2  /  Alb  3.4  /  TBili  0.9  /  DBili  0.3  /  AST  58<H>  /  ALT  61<H>  /  AlkPhos  118  08-09    LIVER FUNCTIONS - ( 09 Aug 2023 05:30 )  Alb: 3.4 g/dL / Pro: 6.2 g/dL / ALK PHOS: 118 U/L / ALT: 61 U/L / AST: 58 U/L / GGT: x           PT/INR - ( 09 Aug 2023 05:30 )   PT: 15.1 sec;   INR: 1.34          PTT - ( 09 Aug 2023 05:30 )  PTT:41.8 sec  CAPILLARY BLOOD GLUCOSE        Urinalysis Basic - ( 09 Aug 2023 05:30 )    Color: x / Appearance: x / SG: x / pH: x  Gluc: 108 mg/dL / Ketone: x  / Bili: x / Urobili: x   Blood: x / Protein: x / Nitrite: x   Leuk Esterase: x / RBC: x / WBC x   Sq Epi: x / Non Sq Epi: x / Bacteria: x        MICRODATA:      RADIOLOGY/OTHER STUDIES:

## 2023-08-10 NOTE — PROGRESS NOTE ADULT - ASSESSMENT
89 y/o Cantonese speaking female, ambulates with walker, has HHA 5 days a week 6hr/day, with PMHx of HTN, paroxysmal Afib (recently started on Eliquis 2.5mg on 7/27), recently diagnosed with HFrEF (EF 40%), [employer] PPM (s/p gen change 2022 @[facility]), bioprosthetic AVR 2009 @[facility], presents accompanied by daughter c/o PEREZ and b/l UE/LE swelling x 1 week. Admitted to cardiac tele for acute on chronic HFrEF exacerbation, currently on IV diuresis. Dr. Lopez and patient family agree to MISAEL for evaluation of severe MR as well as pre-test for Cardioversion for today. EP following.  89 y/o Cantonese speaking female, ambulates with walker, has HHA 5 days a week 6hr/day, with PMHx of HTN, paroxysmal Afib (recently started on Eliquis 2.5mg on 7/27), recently diagnosed with HFrEF (EF 40%), [employer] PPM (s/p gen change 2022 @[facility]), bioprosthetic AVR 2009 @[facility], presents accompanied by daughter c/o PEREZ and b/l UE/LE swelling x 1 week. Admitted to cardiac tele for acute on chronic HFrEF exacerbation, currently on IV diuresis. Dr. Lopez and patient family agree to MISAEL for evaluation of severe MR/atrial appendage w/o - Cardioversion for today. EP following.  89 y/o Cantonese speaking female, ambulates with walker, has HHA 5 days a week 6hr/day, with PMHx of HTN, paroxysmal Afib (recently started on Eliquis 2.5mg on 7/27), recently diagnosed with HFrEF (EF 40%), [employer] PPM (s/p gen change 2022 @[facility]), bioprosthetic AVR 2009 @[facility], presents accompanied by daughter c/o PEREZ and b/l UE/LE swelling x 1 week. Admitted to cardiac tele for acute on chronic HFrEF exacerbation, currently on IV diuresis. Dr. Lopez and patient family agree to MISAEL for evaluation of severe MR/atrial appendage. MISAEL completed and Cardioversion only succesfull momentarily. Per EP, load patient with Digoxin 250 mcg x 1 on 08/10/23 and a load of 125 mcg on AM of 08/11/23. Digoxin level to be measured Saturday AM.  89 y/o Cantonese speaking female, ambulates with walker, has HHA 5 days a week 6hr/day, with PMHx of HTN, paroxysmal Afib (recently started on Eliquis 2.5mg on 7/27), recently diagnosed with HFrEF (EF 40%), [employer] PPM (s/p gen change 2022 @[facility]), bioprosthetic AVR 2009 @[facility], presents accompanied by daughter c/o PEREZ and b/l UE/LE swelling x 1 week. Admitted to cardiac tele for acute on chronic HFrEF exacerbation, currently on IV diuresis. Dr. Lopez and patient family agree to MISAEL for evaluation of severe MR/atrial appendage. MISAEL completed and Cardioversion only successful momentarily. Per EP, load patient with Digoxin 250 mcg x 1 on 08/10/23 and a load of 125 mcg on AM of 08/11/23. Digoxin level to be measured Saturday AM. Pt rec MARTINE.  89 y/o Cantonese speaking female, ambulates with walker, has HHA 5 days a week 6hr/day, with PMHx of HTN, paroxysmal Afib (recently started on Eliquis 2.5mg on 7/27), recently diagnosed with HFrEF (EF 40%), [employer] PPM (s/p gen change 2022 @[facility]), bioprosthetic AVR 2009 @[facility], presents accompanied by daughter c/o PEREZ and b/l UE/LE swelling x 1 week. Admitted to cardiac tele for acute on chronic HFrEF exacerbation, currently on IV Lasix 40 mg BID and switch to Torsemide 20 mg PO tomorrow. Dr. Lopez and patient family agreed to MISAEL for evaluation of severe MR/atrial appendage. MISAEL (08/10/23) found EF 30-35% w/ global hypokinesis, dilated LA and severe MR. Cardioversion only successful momentarily. Per EP, load patient with Digoxin 250 mcg x 1 on 08/10/23 and a load of 125 mcg on AM of 08/11/23. Digoxin level to be measured Saturday AM. Pt rec MARTINE.

## 2023-08-10 NOTE — PROGRESS NOTE ADULT - PROBLEM SELECTOR PLAN 3
Echo w/ severe MR, mod TR/PA, bioprosthetic AVR apparent normal function.  -BP control w/ Entresto 49/51mg BID and Toprol  -c/w IV diuresis  -MISAEL done today for evaluation, SHD eval for possible Mitraclip if aligned w/ pt's GOC. Will discuss w/ outpt cardiologist Dr Lopez Echo w/ severe MR, mod TR/UT, bioprosthetic AVR apparent normal function.  -BP control w/ Entresto 49/51mg BID and Toprol  -c/w IV diuresis w/ Torsenude 40 mg BID   -MISAEL done today for evaluation, SHD eval for possible Mitraclip if aligned w/ pt's GOC. Will discuss w/ outpt cardiologist Dr Lopez Echo w/ severe MR, mod TR/AK, bioprosthetic AVR apparent normal function.  -BP control w/ Entresto 49/51mg BID and Toprol  -c/w Torsemide 20 mg daily starting 8/11/23  -MISAEL done today for evaluation - severe MR   - SHD eval for possible Mitraclip if aligned w/ pt's GOC. Will discuss w/ outpt cardiologist Dr Lopez

## 2023-08-10 NOTE — DISCHARGE NOTE PROVIDER - NSDCCPCAREPLAN_GEN_ALL_CORE_FT
PRINCIPAL DISCHARGE DIAGNOSIS  Diagnosis: Acute decompensated heart failure  Assessment and Plan of Treatment: Please continue ENTRESTO 49/51 as listed to keep your blood pressure controlled. For blood pressure that is too high or too low please see your doctor or go to the emergency room as necessary.     PRINCIPAL DISCHARGE DIAGNOSIS  Diagnosis: Acute decompensated heart failure  Assessment and Plan of Treatment: Please continue ENTRESTO 49/51mg DAILY and TORSEMIDE 20 mg DAILY as listed to keep your blood pressure controlled. For blood pressure that is too high or too low please see your doctor or go to the emergency room as necessary.      SECONDARY DISCHARGE DIAGNOSES  Diagnosis: Paroxysmal atrial fibrillation  Assessment and Plan of Treatment: You have an abnormal heart rhythm (arrhythmia) called atrial fibrillation. With this condition, the hearts 2 upper chambers (the atria) quiver rather than squeeze the blood out in a normal pattern. This leads to an irregular and sometimes rapid heartbeat. Atrial fibrillation is serious condition as it affects the heart’s ability to fill with blood as it should and blood clots may form, which increases the risk for stroke.  -Please CONTINUE  ELIQUIS 2.5MG TWICE A DAY to prevent a stroke.  -Please CONTINUE METOPROLOL 50 mg DAILY to keep your heart in normal rhythm  -Please follow up with     Diagnosis: Essential hypertension  Assessment and Plan of Treatment: Please continue METOPROLOL 50 mg DAILY as listed to keep your blood pressure controlled. For blood pressure that is too high or too low please see your doctor or go to the emergency room as necessary.       PRINCIPAL DISCHARGE DIAGNOSIS  Diagnosis: Acute decompensated heart failure  Assessment and Plan of Treatment: You have a weak heart, also known as Congestive Heart Failure (CHF). Heart failure is a condition in which the heart does not pump or fill with blood well. As a result, the heart lags behind in its job of moving blood throughout the body. This can lead to symptoms such as swelling, trouble breathing, and feeling tired. Your Ejection Fraction (EF) is 30-35% a normal EF is 55-60%.  -Please continue Torsemide 20 mg twice daily to prevent fluid build up in the body.  -Please continue Toprol XL 50 mg daily and Entresto 49/51 mg twice daily to help strenghten your heart muscle.  -Avoid drinking more than 1.5L of fluid daily and maintain a low salt diet (max 2grams daily).  -Please weigh yourself daily, for any significant increases in daily weight of 2lbs/day or 5lbs/week with associated swelling in the legs or abdomen and/or shortness of breath, please call your doctor or go to the emergency room.  -Follow up with Dr. Lopez on Sunday 8/13 @ 4pm.      SECONDARY DISCHARGE DIAGNOSES  Diagnosis: Paroxysmal atrial fibrillation  Assessment and Plan of Treatment: You have an abnormal heart rhythm (arrhythmia) called atrial fibrillation. With this condition, the hearts 2 upper chambers (the atria) quiver rather than squeeze the blood out in a normal pattern. This leads to an irregular and sometimes rapid heartbeat. Atrial fibrillation is serious condition as it affects the heart’s ability to fill with blood as it should and blood clots may form, which increases the risk for stroke.  -Please CONTINUE  ELIQUIS 2.5MG TWICE A DAY to prevent a stroke.  -Please CONTINUE METOPROLOL 50 mg DAILY to keep your heart in normal rhythm  -Please CONTINUE DIGOXIN 125 mcg EVERY OTHER DAY to keep heart in regular rhythm  -Please follow up with Dr. Laura    Diagnosis: Essential hypertension  Assessment and Plan of Treatment: Please continue METOPROLOL 50 mg DAILY and ENTRESTO 49/51 twice daily as listed to keep your blood pressure controlled. For blood pressure that is too high or too low please see your doctor or go to the emergency room as necessary.      Diagnosis: Severe mitral regurgitation  Assessment and Plan of Treatment: Follow up OUTPATIENT with Dr. Mosher for possible Mitraclip in the future.     PRINCIPAL DISCHARGE DIAGNOSIS  Diagnosis: Acute decompensated heart failure  Assessment and Plan of Treatment: You have a weak heart, also known as Congestive Heart Failure (CHF). Heart failure is a condition in which the heart does not pump or fill with blood well. As a result, the heart lags behind in its job of moving blood throughout the body. This can lead to symptoms such as swelling, trouble breathing, and feeling tired. Your Ejection Fraction (EF) is 30-35% a normal EF is 55-60%.  -Please continue Torsemide 20 mg twice daily to prevent fluid build up in the body.  -Please continue Toprol XL 50 mg daily and Entresto 49/51 mg twice daily to help strenghten your heart muscle.  -Avoid drinking more than 1.5L of fluid daily and maintain a low salt diet (max 2grams daily).  -Please weigh yourself daily, for any significant increases in daily weight of 2lbs/day or 5lbs/week with associated swelling in the legs or abdomen and/or shortness of breath, please call your doctor or go to the emergency room.  -Follow up with Dr. Lopez on Sunday 8/13 @ 4pm.      SECONDARY DISCHARGE DIAGNOSES  Diagnosis: Paroxysmal atrial fibrillation  Assessment and Plan of Treatment: You have an abnormal heart rhythm (arrhythmia) called atrial fibrillation. With this condition, the hearts 2 upper chambers (the atria) quiver rather than squeeze the blood out in a normal pattern. This leads to an irregular and sometimes rapid heartbeat. Atrial fibrillation is serious condition as it affects the heart’s ability to fill with blood as it should and blood clots may form, which increases the risk for stroke.  -Please CONTINUE  ELIQUIS 2.5MG TWICE A DAY to prevent a stroke.  -Please CONTINUE METOPROLOL 50 mg DAILY to keep your heart in normal rhythm  -Please CONTINUE AMIODARONE 200 mg DAILY.   -Please follow up with Dr. Laura in 1-2 weeks.    Diagnosis: Essential hypertension  Assessment and Plan of Treatment: Please continue METOPROLOL 50 mg DAILY and ENTRESTO 49/51 twice daily as listed to keep your blood pressure controlled. For blood pressure that is too high or too low please see your doctor or go to the emergency room as necessary.      Diagnosis: Severe mitral regurgitation  Assessment and Plan of Treatment: Follow up OUTPATIENT with Dr. Mosher for possible Mitraclip in the future.

## 2023-08-10 NOTE — DISCHARGE NOTE PROVIDER - ATTENDING DISCHARGE PHYSICAL EXAMINATION:
Patient seen and examined at bedside today. Fine crackles at the left bases, lungs CTA otherwise. Has been in and out of Afib. EP recommended amio po daily in lieu  of Digoxin. Patient will follow up with Dr Jerome Lopez this sunday at 4 pm. She will have repeat labs to monitor Renal function. Plan to Mitral clip likely next wednesday with Dr haines

## 2023-08-10 NOTE — PROGRESS NOTE ADULT - PROBLEM SELECTOR PLAN 5
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 Richardson

## 2023-08-10 NOTE — DISCHARGE NOTE PROVIDER - ATTENDING ATTESTATION STATEMENT
[FreeTextEntry1] : Counseled the patient on constipation and how it can affect the urinary tract. We discussed increasing water intake, daily fruits and vegetables, and sources of fiber.  We recommended 4 servings of whole fruit per day, excluding dried fruit or juices.  We also recommended supplementing soluble fiber intake with gummy fiber #2/day.\par \par \par UA - urine extremely concentrated, counseled pt on vicious cycle of limiting water and its impact on constipation, and subsequently how constipation can exacerbate bladder symptoms\par \par \par \par We also discussed third line therapies for OAB, including tibial nerve stimulation (weekly vs implantable), intravesical bladder Botox, and sacral neuromodulation.\par \par \par he is interested in neuromodulation but we will need to discuss with Dr William before any implantables OR tibial nerve neuromodulation\par \par we discussed botox, r/b/a, we may need to consider botox in interim until OTPUNE is complete?\par \par \par \par PEMs provided  I have personally seen and examined the patient. I have collaborated with and supervised the

## 2023-08-10 NOTE — PROGRESS NOTE ADULT - ASSESSMENT
89 y/oF  Cantonese speaking PMHx of HTN, paroxysmal AFib ( on Eliquis 2.5mg), bioprosthetic AVR # NYP-Epi ( 2009, past AV peak gradient 1.7 in 2022), hx PPM ( 2012) and s/p generator change ( 7/2022 at [facility] by EP Dr. Donovan Wolff), developed PEREZ/leg swelling last Friday 8/4/23 however refused to go the hospital, Cardiologist  prescribed standing Lasix 40mg daily and started Entresto 24/26 bid  for CHF and advised daughter to take pt to the hospital if no improvement. Daughter brought pt to [facility] ED ( 8/8) , admitted for acute hypoxic respiratory failure ( required NC2L) 2/2 ADHF ( proBNP 9310), and hyponatremia ( SNa+ 126), and mild transaminitis. Pt was admitted to Cardiology service, and started on O2 via NC2L, and lasix 40mg iv bid.   Pt seen this am, resting in bed, no apparent distress, on NC2L, feels better but still has residual SOB.     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 Autumn Galvan (case discussed)  - O2 via NC 2L, titrate to keep SpO2>90%  -  check SpO2 RA, likely can come off oxygen   - ipratropium  for Nebulization 500 MICROGram(s) Nebulizer every 6 hours PRN  - furosemide   Injectable 40 milliGRAM(s) IV Push two times a day ( 8/8-), pt euvolemic now, consider d/c iv diuretics and  transitioning to oral diuretics   - monitor UO 1875ml   - one liter fluid restriction   - keep K>4 and Mg>2  - TTE ( 8/9): moderate reduced EF 35-40%, reduced RV function, severely dilated LA, bioAV transvalvular gradient 7mmHg, severe MR  - EP consult appreciated, Pt NPO for possible MISAEL/DCCV today ( 8/10)   - GDMT:   increased metoprolol succinate ER 50 milliGRAM(s) Oral daily  increased sacubitril 49 mG/valsartan 51 mG 1 Tablet(s) Oral two times a day  -pAFib: c/w DOAC apixaban 2.5 milliGRAM(s) Oral every 12 hours    # Hyponatremia ( likely dilutional)   - likely dilutional   - SNa+ 126(8/8)-> 130(8/9)   - one liter fluid restriction   - Serum osm 287, urine osm 255, urine sodium 90   - check TSH    # Constipation  - polyethylene glycol 3350 17 Gram(s) Oral daily    # Gait abnormality  - baseline WC assist  - PT eval appreciated   - bedside PT   - fall precaution     # DVT ppx: DOAC     # code status: FULL CODE     # Contact:  [facility]: Dr.David Lopez [phone]  PMD: Dr. Gaudencio Wolff [phone]     Ileana Sapp (daughter) [phone]    # Disposition: medically active, EP for possible MISAEL/DCCV today, possible d/c home tomorrow Fri 8/11    # POC as d/w cardiology team Dr. Galvan/Hollie KABA , and referring Cardiologist Dr. Jerome Lopez and PMD Dr. Gaudencio Wolff

## 2023-08-10 NOTE — DISCHARGE NOTE PROVIDER - PROVIDER TOKENS
PROVIDER:[TOKEN:[[id number]]],PROVIDER:[TOKEN:[[id number]]] PROVIDER:[TOKEN:[[id number]],SCHEDULEDAPPT:[08/13/2023],SCHEDULEDAPPTTIME:[04:00 PM]],PROVIDER:[TOKEN:[[id number]],FOLLOWUP:[1 week]]

## 2023-08-11 ENCOUNTER — TRANSCRIPTION ENCOUNTER (OUTPATIENT)
Age: 88
End: 2023-08-11

## 2023-08-11 VITALS
HEART RATE: 74 BPM | RESPIRATION RATE: 18 BRPM | OXYGEN SATURATION: 93 % | SYSTOLIC BLOOD PRESSURE: 143 MMHG | DIASTOLIC BLOOD PRESSURE: 78 MMHG

## 2023-08-11 LAB
ANION GAP SERPL CALC-SCNC: 10 MMOL/L — SIGNIFICANT CHANGE UP (ref 5–17)
APPEARANCE UR: CLEAR — SIGNIFICANT CHANGE UP
BACTERIA # UR AUTO: PRESENT /HPF
BASOPHILS # BLD AUTO: 0.06 K/UL — SIGNIFICANT CHANGE UP (ref 0–0.2)
BASOPHILS NFR BLD AUTO: 0.8 % — SIGNIFICANT CHANGE UP (ref 0–2)
BILIRUB UR-MCNC: NEGATIVE — SIGNIFICANT CHANGE UP
BUN SERPL-MCNC: 36 MG/DL — HIGH (ref 7–23)
CALCIUM SERPL-MCNC: 8.6 MG/DL — SIGNIFICANT CHANGE UP (ref 8.4–10.5)
CHLORIDE SERPL-SCNC: 95 MMOL/L — LOW (ref 96–108)
CO2 SERPL-SCNC: 30 MMOL/L — SIGNIFICANT CHANGE UP (ref 22–31)
COD CRY URNS QL: ABNORMAL /HPF
COLOR SPEC: YELLOW — SIGNIFICANT CHANGE UP
COMMENT - URINE: SIGNIFICANT CHANGE UP
CREAT ?TM UR-MCNC: 30 MG/DL — SIGNIFICANT CHANGE UP
CREAT SERPL-MCNC: 1.2 MG/DL — SIGNIFICANT CHANGE UP (ref 0.5–1.3)
DIFF PNL FLD: ABNORMAL
DIGOXIN SERPL-MCNC: 1.2 NG/ML — SIGNIFICANT CHANGE UP (ref 0.8–2)
EGFR: 42 ML/MIN/1.73M2 — LOW
EOSINOPHIL # BLD AUTO: 0.24 K/UL — SIGNIFICANT CHANGE UP (ref 0–0.5)
EOSINOPHIL NFR BLD AUTO: 3.1 % — SIGNIFICANT CHANGE UP (ref 0–6)
EPI CELLS # UR: SIGNIFICANT CHANGE UP /HPF (ref 0–5)
GLUCOSE SERPL-MCNC: 88 MG/DL — SIGNIFICANT CHANGE UP (ref 70–99)
GLUCOSE UR QL: NEGATIVE — SIGNIFICANT CHANGE UP
HCT VFR BLD CALC: 37.1 % — SIGNIFICANT CHANGE UP (ref 34.5–45)
HGB BLD-MCNC: 11.9 G/DL — SIGNIFICANT CHANGE UP (ref 11.5–15.5)
IMM GRANULOCYTES NFR BLD AUTO: 0.3 % — SIGNIFICANT CHANGE UP (ref 0–0.9)
KETONES UR-MCNC: NEGATIVE — SIGNIFICANT CHANGE UP
LEUKOCYTE ESTERASE UR-ACNC: ABNORMAL
LYMPHOCYTES # BLD AUTO: 1.75 K/UL — SIGNIFICANT CHANGE UP (ref 1–3.3)
LYMPHOCYTES # BLD AUTO: 22.3 % — SIGNIFICANT CHANGE UP (ref 13–44)
MAGNESIUM SERPL-MCNC: 2.2 MG/DL — SIGNIFICANT CHANGE UP (ref 1.6–2.6)
MCHC RBC-ENTMCNC: 30.2 PG — SIGNIFICANT CHANGE UP (ref 27–34)
MCHC RBC-ENTMCNC: 32.1 GM/DL — SIGNIFICANT CHANGE UP (ref 32–36)
MCV RBC AUTO: 94.2 FL — SIGNIFICANT CHANGE UP (ref 80–100)
MONOCYTES # BLD AUTO: 0.92 K/UL — HIGH (ref 0–0.9)
MONOCYTES NFR BLD AUTO: 11.7 % — SIGNIFICANT CHANGE UP (ref 2–14)
NEUTROPHILS # BLD AUTO: 4.85 K/UL — SIGNIFICANT CHANGE UP (ref 1.8–7.4)
NEUTROPHILS NFR BLD AUTO: 61.8 % — SIGNIFICANT CHANGE UP (ref 43–77)
NITRITE UR-MCNC: POSITIVE
NRBC # BLD: 0 /100 WBCS — SIGNIFICANT CHANGE UP (ref 0–0)
OSMOLALITY UR: 339 MOSM/KG — SIGNIFICANT CHANGE UP (ref 300–900)
PH UR: 6.5 — SIGNIFICANT CHANGE UP (ref 5–8)
PLATELET # BLD AUTO: 132 K/UL — LOW (ref 150–400)
POTASSIUM SERPL-MCNC: 4.5 MMOL/L — SIGNIFICANT CHANGE UP (ref 3.5–5.3)
POTASSIUM SERPL-SCNC: 4.5 MMOL/L — SIGNIFICANT CHANGE UP (ref 3.5–5.3)
POTASSIUM UR-SCNC: 21 MMOL/L — SIGNIFICANT CHANGE UP
PROT ?TM UR-MCNC: <4 MG/DL — SIGNIFICANT CHANGE UP (ref 0–12)
PROT UR-MCNC: NEGATIVE MG/DL — SIGNIFICANT CHANGE UP
PROT/CREAT UR-RTO: SIGNIFICANT CHANGE UP (ref 0–0.2)
RBC # BLD: 3.94 M/UL — SIGNIFICANT CHANGE UP (ref 3.8–5.2)
RBC # FLD: 13.7 % — SIGNIFICANT CHANGE UP (ref 10.3–14.5)
RBC CASTS # UR COMP ASSIST: < 5 /HPF — SIGNIFICANT CHANGE UP
SODIUM SERPL-SCNC: 135 MMOL/L — SIGNIFICANT CHANGE UP (ref 135–145)
SODIUM UR-SCNC: 87 MMOL/L — SIGNIFICANT CHANGE UP
SP GR SPEC: 1.01 — SIGNIFICANT CHANGE UP (ref 1–1.03)
UROBILINOGEN FLD QL: 0.2 E.U./DL — SIGNIFICANT CHANGE UP
UUN UR-MCNC: 350 MG/DL — SIGNIFICANT CHANGE UP
WBC # BLD: 7.84 K/UL — SIGNIFICANT CHANGE UP (ref 3.8–10.5)
WBC # FLD AUTO: 7.84 K/UL — SIGNIFICANT CHANGE UP (ref 3.8–10.5)
WBC UR QL: < 5 /HPF — SIGNIFICANT CHANGE UP

## 2023-08-11 PROCEDURE — 86900 BLOOD TYPING SEROLOGIC ABO: CPT

## 2023-08-11 PROCEDURE — 93005 ELECTROCARDIOGRAM TRACING: CPT

## 2023-08-11 PROCEDURE — 80061 LIPID PANEL: CPT

## 2023-08-11 PROCEDURE — 83735 ASSAY OF MAGNESIUM: CPT

## 2023-08-11 PROCEDURE — 84443 ASSAY THYROID STIM HORMONE: CPT

## 2023-08-11 PROCEDURE — 97161 PT EVAL LOW COMPLEX 20 MIN: CPT

## 2023-08-11 PROCEDURE — 83880 ASSAY OF NATRIURETIC PEPTIDE: CPT

## 2023-08-11 PROCEDURE — 83036 HEMOGLOBIN GLYCOSYLATED A1C: CPT

## 2023-08-11 PROCEDURE — 94640 AIRWAY INHALATION TREATMENT: CPT

## 2023-08-11 PROCEDURE — 81001 URINALYSIS AUTO W/SCOPE: CPT

## 2023-08-11 PROCEDURE — 83935 ASSAY OF URINE OSMOLALITY: CPT

## 2023-08-11 PROCEDURE — 82570 ASSAY OF URINE CREATININE: CPT

## 2023-08-11 PROCEDURE — 87184 SC STD DISK METHOD PER PLATE: CPT

## 2023-08-11 PROCEDURE — 84100 ASSAY OF PHOSPHORUS: CPT

## 2023-08-11 PROCEDURE — 86850 RBC ANTIBODY SCREEN: CPT

## 2023-08-11 PROCEDURE — 87086 URINE CULTURE/COLONY COUNT: CPT

## 2023-08-11 PROCEDURE — 83930 ASSAY OF BLOOD OSMOLALITY: CPT

## 2023-08-11 PROCEDURE — 71045 X-RAY EXAM CHEST 1 VIEW: CPT

## 2023-08-11 PROCEDURE — 84133 ASSAY OF URINE POTASSIUM: CPT

## 2023-08-11 PROCEDURE — 84156 ASSAY OF PROTEIN URINE: CPT

## 2023-08-11 PROCEDURE — 93010 ELECTROCARDIOGRAM REPORT: CPT

## 2023-08-11 PROCEDURE — 86901 BLOOD TYPING SEROLOGIC RH(D): CPT

## 2023-08-11 PROCEDURE — 84300 ASSAY OF URINE SODIUM: CPT

## 2023-08-11 PROCEDURE — 93306 TTE W/DOPPLER COMPLETE: CPT

## 2023-08-11 PROCEDURE — 85025 COMPLETE CBC W/AUTO DIFF WBC: CPT

## 2023-08-11 PROCEDURE — 84540 ASSAY OF URINE/UREA-N: CPT

## 2023-08-11 PROCEDURE — 80048 BASIC METABOLIC PNL TOTAL CA: CPT

## 2023-08-11 PROCEDURE — 36415 COLL VENOUS BLD VENIPUNCTURE: CPT

## 2023-08-11 PROCEDURE — 99285 EMERGENCY DEPT VISIT HI MDM: CPT

## 2023-08-11 PROCEDURE — 82248 BILIRUBIN DIRECT: CPT

## 2023-08-11 PROCEDURE — 85610 PROTHROMBIN TIME: CPT

## 2023-08-11 PROCEDURE — 96374 THER/PROPH/DIAG INJ IV PUSH: CPT

## 2023-08-11 PROCEDURE — 99233 SBSQ HOSP IP/OBS HIGH 50: CPT

## 2023-08-11 PROCEDURE — 85027 COMPLETE CBC AUTOMATED: CPT

## 2023-08-11 PROCEDURE — 84484 ASSAY OF TROPONIN QUANT: CPT

## 2023-08-11 PROCEDURE — 80053 COMPREHEN METABOLIC PANEL: CPT

## 2023-08-11 PROCEDURE — 80162 ASSAY OF DIGOXIN TOTAL: CPT

## 2023-08-11 PROCEDURE — 85730 THROMBOPLASTIN TIME PARTIAL: CPT

## 2023-08-11 PROCEDURE — 93312 ECHO TRANSESOPHAGEAL: CPT

## 2023-08-11 PROCEDURE — 83605 ASSAY OF LACTIC ACID: CPT

## 2023-08-11 RX ORDER — APIXABAN 2.5 MG/1
1 TABLET, FILM COATED ORAL
Refills: 0 | DISCHARGE

## 2023-08-11 RX ORDER — APIXABAN 2.5 MG/1
1 TABLET, FILM COATED ORAL
Qty: 60 | Refills: 3
Start: 2023-08-11 | End: 2023-12-08

## 2023-08-11 RX ORDER — DIGOXIN 250 MCG
1 TABLET ORAL
Qty: 15 | Refills: 3
Start: 2023-08-11 | End: 2023-12-08

## 2023-08-11 RX ORDER — FUROSEMIDE 40 MG
1 TABLET ORAL
Refills: 0 | DISCHARGE

## 2023-08-11 RX ORDER — AMIODARONE HYDROCHLORIDE 400 MG/1
1 TABLET ORAL
Qty: 30 | Refills: 3
Start: 2023-08-11 | End: 2023-12-08

## 2023-08-11 RX ORDER — ATORVASTATIN CALCIUM 80 MG/1
1 TABLET, FILM COATED ORAL
Qty: 30 | Refills: 3
Start: 2023-08-11 | End: 2023-12-08

## 2023-08-11 RX ORDER — SACUBITRIL AND VALSARTAN 24; 26 MG/1; MG/1
1 TABLET, FILM COATED ORAL
Refills: 0 | DISCHARGE

## 2023-08-11 RX ORDER — AMIODARONE HYDROCHLORIDE 400 MG/1
200 TABLET ORAL DAILY
Refills: 0 | Status: DISCONTINUED | OUTPATIENT
Start: 2023-08-11 | End: 2023-08-11

## 2023-08-11 RX ORDER — SACUBITRIL AND VALSARTAN 24; 26 MG/1; MG/1
1 TABLET, FILM COATED ORAL
Qty: 60 | Refills: 3
Start: 2023-08-11 | End: 2023-12-08

## 2023-08-11 RX ORDER — METOPROLOL TARTRATE 50 MG
1 TABLET ORAL
Refills: 0 | DISCHARGE

## 2023-08-11 RX ORDER — METOPROLOL TARTRATE 50 MG
1 TABLET ORAL
Qty: 30 | Refills: 3
Start: 2023-08-11 | End: 2023-12-08

## 2023-08-11 RX ADMIN — Medication 125 MICROGRAM(S): at 08:27

## 2023-08-11 RX ADMIN — Medication 20 MILLIGRAM(S): at 13:01

## 2023-08-11 RX ADMIN — Medication 20 MILLIGRAM(S): at 06:38

## 2023-08-11 RX ADMIN — APIXABAN 2.5 MILLIGRAM(S): 2.5 TABLET, FILM COATED ORAL at 06:38

## 2023-08-11 RX ADMIN — SACUBITRIL AND VALSARTAN 1 TABLET(S): 24; 26 TABLET, FILM COATED ORAL at 06:38

## 2023-08-11 RX ADMIN — AMIODARONE HYDROCHLORIDE 200 MILLIGRAM(S): 400 TABLET ORAL at 14:44

## 2023-08-11 RX ADMIN — Medication 50 MILLIGRAM(S): at 06:38

## 2023-08-11 NOTE — PROGRESS NOTE ADULT - ASSESSMENT
89 y/o Cantonese speaking female, ambulates with walker, has HHA 5 days a week 6hr/day, with PMHx of HTN, paroxysmal Afib (recently started on Eliquis 2.5mg on 7/27), recently diagnosed with HFrEF (EF 40%), [employer]tr[employer] PPM (s/p gen change 2022 @[facility]), bioprosthetic AVR 2009 @[facility], presents accompanied by daughter c/o PEREZ and b/l UE/LE swelling x 1 week. Admitted to cardiac tele for acute on chronic HFrEF exacerbation, currently on IV Lasix 40 mg BID and switch to Torsemide 20 mg PO tomorrow. Dr. Lopez and patient family agreed to MISAEL for evaluation of severe MR/atrial appendage. MISAEL (08/10/23) found EF 30-35% w/ global hypokinesis, dilated LA and severe MR. Cardioversion only successful momentarily. Per EP, load patient with Digoxin 250 mcg x 1 on 08/10/23 and a load of 125 mcg on AM of 08/11/23. Digoxin level to be measured Saturday AM. Pt rec MARTINE.

## 2023-08-11 NOTE — DISCHARGE NOTE NURSING/CASE MANAGEMENT/SOCIAL WORK - NSDCPEFALRISK_GEN_ALL_CORE
For information on Fall & Injury Prevention, visit: [email or web address] OR  [email or web address] OR  https://www.cdc.gov/steadi/patient.html

## 2023-08-11 NOTE — PROGRESS NOTE ADULT - ASSESSMENT
89 year old Cantonese speaking female with PMHx of HTN, PAF (recently started Eliquis 7/27), Medtronic dual chamber PPM (unclear initial indication) initially implanted in 2012 s/p gen change 2022 ([facility]), history of aortic bioprosthetic valve implanted in 2009, who presents with worsening froylan LE swelling and PEREZ for the past week. Pt saw cardiologist Dr. Jerome Lopez on 7/27, pt and daughter refused to go to hospital at that time, pt was initiated on Lasix, Toprol and Entresto, now presents with worsening respiratory symptoms and ADHF. Pt been in persistent AF since July 29th but was having long paroxysms before that as well.  Echo 8/9/23 @[facility] revealed EF 35-40% and severe MR.    -S/p MISAEL/DCCV 8/10, initially converted to SR for 30mins, then reverted back to Afib. Currently in SR 70s  -S/p Digoxin load 250mcg 8/10, and 125mcg 8/11 AM. F/u Digoxin level.   -Considering AVN ablation w/ BiV upgrade if recurrent Afib RVR. Will discuss w/ daughter Ileana Sapp [phone]  -Plan to return for outpatient Mitraclip next Wednesday for severe MR   89 year old Cantonese speaking female with PMHx of HTN, PAF (recently started Eliquis 7/27), Medtronic dual chamber PPM (unclear initial indication) initially implanted in 2012 s/p gen change 2022 ([facility]), history of aortic bioprosthetic valve implanted in 2009, who presents with worsening froylan LE swelling and PEREZ for the past week. Pt saw cardiologist Dr. Jerome Lopez on 7/27, pt and daughter refused to go to hospital at that time, pt was initiated on Lasix, Toprol and Entresto, now presents with worsening respiratory symptoms and ADHF. Pt been in persistent AF since July 29th but was having long paroxysms before that as well.  Echo 8/9/23 @[facility] revealed EF 35-40% and severe MR.    -S/p MISAEL/DCCV 8/10, initially converted to SR for 30mins, then reverted back to Afib. Currently in SR 70s  -S/p Digoxin load 250mcg 8/10, and 125mcg 8/11 AM. Digoxin level 1.2  -Start Amiodarone 200mg daily  -Considering AVN ablation w/ BiV upgrade if recurrent Afib RVR. Discussed w/ daughter Ileana Sapp [phone]  -Plan to return for outpatient Mitraclip for severe MR   89 year old Cantonese speaking female with PMHx of HTN, PAF (recently started Eliquis 7/27), Medtronic dual chamber PPM (unclear initial indication) initially implanted in 2012 s/p gen change 2022 ([facility]), history of aortic bioprosthetic valve implanted in 2009, who presents with worsening froylan LE swelling and PEREZ for the past week. Pt saw cardiologist Dr. Jerome Lopez on 7/27, pt and daughter refused to go to hospital at that time, pt was initiated on Lasix, Toprol and Entresto, now presents with worsening respiratory symptoms and ADHF. Pt been in persistent AF since July 29th but was having long paroxysms before that as well.  Echo 8/9/23 @[facility] revealed EF 35-40% and severe MR.    -S/p MISAEL/DCCV 8/10, initially converted to SR for 30mins, then reverted back to Afib. Overnight pt converted to SR 70s  -S/p Digoxin load 250mcg x1 8/10 PM, and 125mcg x1 8/11 AM. Elevated digoxin level 1.2  -Start Amiodarone 200mg daily  -Considering AVN ablation w/ BiV upgrade if recurrent Afib RVR. Discussed w/ daughter Ileana Sapp [phone]  -Plan to return for outpatient Mitraclip for severe MR in near future. Pt may not maintain NSR long term if patient having severe MR

## 2023-08-11 NOTE — PROGRESS NOTE ADULT - PROBLEM SELECTOR PLAN 1
Recently diagnosed with HFrEF (EF 40%) by outpt cardiologist Dr Jerome Lopez 2 weeks ago, and started on Lasix 40mg daily, Entresto 24/26mg BID, Toprol 50mg qd.  -Etiology possibly 2/2 Afib RVR, severe MR, vs ischemic in nature  -BNP 9310, hs Trop T negative x1. AST//82 (improving)  -CXR w/ pulm vasc congestion  -EKG Afib 120s, Q waves V1-V3  -Bedside ECHO 8/9/23 @ [facility]: EF 35-40%, global hypokinesis, mildly dilated LV/RV, MATI, reduced RVSF, bioprosthetic AVR apparent normal function, severe MR, mod TR/CO.  -TTE (08/10/23) @ [facility]: EF 30-35%, global hypokinesis, dilated LA, reduced RVSF. MV is mildly thickened, severe MR, severe non-mobile plaque seen in descending aorta and aortic arch.   -DIURESIS: s/p lasix 40 mg IVP BID, near euvolemia/elevated BUN and GFR 8/10/23, will switch to Torsemide 20 mg PO 8/11/23  -GDMT: Entresto 49/51 mg BID, Toprol XL 50 mg daily   -HF core measure, daily weight. strict I&Os

## 2023-08-11 NOTE — PROVIDER CONTACT NOTE (OTHER) - ACTION/TREATMENT ORDERED:
Review BP in one hour
No further orders at this time. Monitoring continued.
To monitor BP, no medication chnages at this time

## 2023-08-11 NOTE — PROGRESS NOTE ADULT - ASSESSMENT
89 y/oF  Cantonese speaking PMHx of HTN, paroxysmal AFib ( on Eliquis 2.5mg), bioprosthetic AVR # NYP-Epi ( 2009, past AV peak gradient 1.7 in 2022), hx PPM ( 2012) and s/p generator change ( 7/2022 at [facility] by EP Dr. Donovan Wolff), developed PEREZ/leg swelling last Friday 8/4/23 however refused to go the hospital, Cardiologist  prescribed standing Lasix 40mg daily and started Entresto 24/26 bid  for CHF and advised daughter to take pt to the hospital if no improvement. Daughter brought pt to [facility] ED ( 8/8) , admitted for acute hypoxic respiratory failure ( required NC2L) 2/2 ADHF ( proBNP 9310), and hyponatremia ( SNa+ 126), and mild transaminitis. Pt was admitted to Cardiology service, and started on O2 via NC2L, and lasix 40mg iv bid.   Pt seen this am, resting in bed, no apparent distress, on NC2L, feels better but still has residual SOB.     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 Autumn Galvan (case discussed)  - off O2   - ipratropium  for Nebulization 500 MICROGram(s) Nebulizer every 6 hours PRN  - furosemide   Injectable 40 milliGRAM(s) IV Push two times a day ( 8/8-), transitioning to oral diuretics Torsemide 20mg po bid   - monitor UO 1875ml   - one liter fluid restriction   - keep K>4 and Mg>2  - TTE ( 8/9): moderate reduced EF 35-40%, reduced RV function, severely dilated LA, bioAV transvalvular gradient 7mmHg, severe MR  - EP f/u appreciated,s/p MISAEL/DCCV ( 8/10) w. recurrent AFib  - s/p Dig loading ( 250mcg Dig x1 8/10, and 125mcg po x1 today), d/c home on Digoxin 125mcg po every other day   - c/w Amiodarone 200mg po daily   - GDMT:   increased metoprolol succinate ER 50 milliGRAM(s) Oral daily  increased sacubitril 49 mG/valsartan 51 mG 1 Tablet(s) Oral two times a day  -pAFib: c/w DOAC apixaban 2.5 milliGRAM(s) Oral every 12 hours    # Hyponatremia ( likely dilutional)   - likely dilutional   - SNa+ 126(8/8)-> 130(8/9)- <135( 8/11)   - one liter fluid restriction   - Serum osm 287, urine osm 255, urine sodium 90     # Constipation  - polyethylene glycol 3350 17 Gram(s) Oral daily    # Gait abnormality  - baseline WC assist  - PT eval appreciated   - bedside PT   - fall precaution     # DVT ppx: DOAC     # code status: FULL CODE     # Contact:  Critical [facility] hospital Cardiology: Dr.David Lopez [phone]  PMD: Dr. Gaudencio Wolff [phone]     Ileana Sapp (daughter) [phone]    # Disposition: medically ready for d/c home today, f/u primary Cardiologist Dr. Jerome Lopez this Sunday 8/13 @4pm, consider AVN ablation and BiV upgrade if recurrent AFib w. RVR, and for outpt Mitraclip evaluation by Dr.Apurva Mosher (Critical access hospital Cardiology)    # POC as d/w cardiology team Dr. Galvan/Hollie KABA , and referring Cardiologist Dr. Jerome Lopez and PMD Dr. Gaudencio Wolff

## 2023-08-11 NOTE — PROGRESS NOTE ADULT - PROBLEM SELECTOR PLAN 4
Hypertensive -160s/90-100s  -c/w Entresto to 49/51mg BID  -c/w home dose Metoprolol 50mg qd    #Hyponatremia  Na 126 on arrival. Likely hypervolemic hyponatremia  - Medicine team following  - Improving Na 133 (8/10)   - 1L fluid restriction

## 2023-08-11 NOTE — PROVIDER CONTACT NOTE (OTHER) - SITUATION
FD=050/98, patient asymptomatic.
BP= 197/88, patient asymptomatic.
Pt has 3 beats ov vtach overnight

## 2023-08-11 NOTE — PROGRESS NOTE ADULT - SUBJECTIVE AND OBJECTIVE BOX
EPS Progress Note    S: Pt seen and examined at bedside, via [employer]  services ID# [id number]. Reports feeling well this AM. Asymptomatic.       Telemetry:            General:  NAD        HEENT:   Normal oral mucosa   Neck: Supple, - JVD; No Carotid Bruits   Cardiovascular: Normal S1 S2, No JVD, No murmurs   Respiratory: CTA B/L    Gastrointestinal:  Soft, Non-tender, + BS	  Skin: No rashes, No ecchymoses, No cyanosis  Extremities: Normal range of motion, No clubbing, cyanosis or edema  Vascular: Peripheral pulses palpable 2+ bilaterally  Neurologic: Non-focal  Psychiatry: A & O x 3, Mood & affect appropriate	      MEDICATIONS  (STANDING):  apixaban 2.5 milliGRAM(s) Oral every 12 hours  metoprolol succinate ER 50 milliGRAM(s) Oral daily  polyethylene glycol 3350 17 Gram(s) Oral daily  sacubitril 49 mG/valsartan 51 mG 1 Tablet(s) Oral two times a day  senna 2 Tablet(s) Oral at bedtime  torsemide 20 milliGRAM(s) Oral daily    MEDICATIONS  (PRN):  ipratropium    for Nebulization 500 MICROGram(s) Nebulizer every 6 hours PRN Shortness of Breath and/or Wheezing  LORazepam     Tablet 0.5 milliGRAM(s) Oral two times a day PRN Anxiety         Labs:                                                               11.9   7.84  )-----------( 132      ( 11 Aug 2023 06:14 )             37.1     08-11    135  |  95<L>  |  36<H>  ----------------------------<  88  4.5   |  30  |  1.20    Ca    8.6      11 Aug 2023 06:14  Mg     2.2     08-11    TPro  6.6  /  Alb  3.5  /  TBili  0.7  /  DBili  x   /  AST  44<H>  /  ALT  51<H>  /  AlkPhos  114  08-10        Assessment/Plan:     EPS Progress Note    S: Pt seen and examined at bedside, via [employer]  services ID# [id number]. Reports feeling well this AM. Asymptomatic.       Telemetry: SR 70s w/ intermittent A-pacing, occasional PVCs           General:  NAD        HEENT:   Normal oral mucosa   Neck: Supple, - JVD; No Carotid Bruits   Cardiovascular: Normal S1 S2, No JVD, No murmurs   Respiratory: CTA B/L    Gastrointestinal:  Soft, Non-tender, + BS	  Skin: No rashes, No ecchymoses, No cyanosis  Extremities: Normal range of motion, No clubbing, cyanosis or edema  Vascular: Peripheral pulses palpable 2+ bilaterally  Neurologic: Non-focal  Psychiatry: A & O x 3, Mood & affect appropriate	      MEDICATIONS  (STANDING):  apixaban 2.5 milliGRAM(s) Oral every 12 hours  metoprolol succinate ER 50 milliGRAM(s) Oral daily  polyethylene glycol 3350 17 Gram(s) Oral daily  sacubitril 49 mG/valsartan 51 mG 1 Tablet(s) Oral two times a day  senna 2 Tablet(s) Oral at bedtime  torsemide 20 milliGRAM(s) Oral daily    MEDICATIONS  (PRN):  ipratropium    for Nebulization 500 MICROGram(s) Nebulizer every 6 hours PRN Shortness of Breath and/or Wheezing  LORazepam     Tablet 0.5 milliGRAM(s) Oral two times a day PRN Anxiety         Labs:                                                               11.9   7.84  )-----------( 132      ( 11 Aug 2023 06:14 )             37.1     08-11    135  |  95<L>  |  36<H>  ----------------------------<  88  4.5   |  30  |  1.20    Ca    8.6      11 Aug 2023 06:14  Mg     2.2     08-11    TPro  6.6  /  Alb  3.5  /  TBili  0.7  /  DBili  x   /  AST  44<H>  /  ALT  51<H>  /  AlkPhos  114  08-10       < from: MISAEL w/Doppler (08.10.23 @ 11:02) >  CONCLUSIONS:     1. Left ventricular systolic function is moderately-to-severely reduced   with a calculated ejection fraction of 30-35% with global hypokinesis.   2. Normal right ventricular size.   3. Reduced right ventricular systolic function.   4. Dilated left atrium.   5. The mitral valve is mildly thickened. There is prolapse of the   anterior leaflet (A2 scallop) resulting in lack of coaptation. The mean   transvalvular gradient is 3.00 mmHg at a heart rate of 99 bpm. The mitral   valve area estimated via planimetry is 3.92 cm². There is severe mitral   regurgitation (wide jet).   6. A bioprosthetic valve is noted in the aortic position. There is no   evidence of aortic regurgitation.   7. There is severe non-mobile plaque seen in the visualized portion of   the descending aorta. There is severe non-mobile plaque seen in the   visualized portion of the aortic arch.    Mitral Valve:  MV Mean Grad:   3.0 mmHg  MVA, 2D planim: 3.92 cm²      ---------------------------------------------------------------------------  -----  FINDINGS:    Left Ventricle:  Left ventricular systolic function is moderately-to-severely reduced with   a calculated ejection fraction of 30-35% with global hypokinesis.    Right Ventricle:  The right ventricle is normal in size. Right ventricular systolic   function is reduced. A device lead is noted in the right heart.    Left Atrium:  No thrombus seen in the left atrium or in the left atrial appendage.   Spectral Doppler reveals decreased left atrial appendage velocities.    Right Atrium:  No thrombus seen in the right atrium or right atrial appendage.    Interatrial Septum:  The interatrial septum appears intact.    Aortic Valve:  A bioprosthetic valve is noted in the aortic position. There is no   evidence of aortic regurgitation.    Mitral Valve:  The mitral valve is mildly thickened. There is prolapse of the anterior   leaflet (A2 scallop) resulting in lack of coaptation. The mean   transvalvular gradient is 3.00 mmHg at a heart rate of 99 bpm. The mitral   valve area estimated via planimetry is 3.92 cm². There is severe mitral   regurgitation (wide jet). The vena contracta width is 0.90 cm (severe   >0.7 cm).    Tricuspid Valve:  Structurally normal tricuspid valve with normal leaflet excursion. There   is trace tricuspid regurgitation.    Pulmonic Valve:  Structurally normal pulmonic valve with normal leaflet excursion. There   is trace pulmonic regurgitation.    Aorta:  There is severe non-mobile plaque seen in the visualized portion of the   descending aorta. There is severe non-mobile plaque seen in the   visualizedportion of the aortic arch.    Pericardium:  No pericardial effusion is seen.    < end of copied text >

## 2023-08-11 NOTE — PROVIDER CONTACT NOTE (OTHER) - ASSESSMENT
Patient is asymptomatic
See 20:13 vitals, pt in no acute distress, continues to be afib on tele
PAtient is asymptomatic, hypertensive on admission

## 2023-08-11 NOTE — PROVIDER CONTACT NOTE (OTHER) - BACKGROUND
Cantonese speaking lady, admitted for CHF exacerbation on background of newly diagnosed CHF.
Admitted for CHF exacerbation, newly diagnosed CHF. History of anxiety
Admitted for CHF, s/p MISAEL/DCCV

## 2023-08-11 NOTE — DISCHARGE NOTE NURSING/CASE MANAGEMENT/SOCIAL WORK - PATIENT PORTAL LINK FT
You can access the FollowMyHealth Patient Portal offered by [facility] by registering at the following website: [email or web address]. By joining [employer]’s FollowMyHealth portal, you will also be able to view your health information using other applications (apps) compatible with our system.

## 2023-08-11 NOTE — PROGRESS NOTE ADULT - PROBLEM SELECTOR PLAN 2
Presented in Afib 120s, currently Afib 80-120s on tele.  -EP interrogated Medtronic PPM, noting increased AF burden. Most recent episode ongoing since 7/29. Overall AF burden since last interrogation (July 2022) 13%.   -c/w Toprol 50mg qd in AM  -c/w Eliquis 2.5mg BID (age 89, Wt 53kg), initiated by Dr Lopez on 7/27  -TTE (08/10/23): EF 30-35% w/ global hypokinesis, dilated LA, reduced RVSF. MV is mildly thickened, severe MR, severe non-mobile plaque seen in descending aorta and aortic arch  -Cardioversion only momentarily successful, patient converted back into Afib  -Digoxin 250 mcg x 1 on 08/10/23, 125 mcg x 1 on AM 08/11/23 (to be done every other day starting tomorrow)  -Dig level to be measured on Saturday

## 2023-08-11 NOTE — PROGRESS NOTE ADULT - SUBJECTIVE AND OBJECTIVE BOX
Patient is a 89y old  Female who presents with a chief complaint of Acute on Chronic CHF Exacerbation (EF unknown) (11 Aug 2023 11:28)    INTERVAL EVENTS: NAEON    SUBJECTIVE:  Patient was seen and examined at bedside. s/p Dig, in NSR.Denies SOB.    Review of systems: No fever, chills, dizziness, HA, Changes in vision, CP, dyspnea, nausea or vomiting, dysuria, changes in bowel movements, LE edema. Rest of 12 point Review of systems negative unless otherwise documented elsewhere in note.     Diet, DASH/TLC:   Sodium & Cholesterol Restricted (08-10-23 @ 14:14) [Active]      MEDICATIONS:  MEDICATIONS  (STANDING):  aMIOdarone    Tablet 200 milliGRAM(s) Oral daily  apixaban 2.5 milliGRAM(s) Oral every 12 hours  metoprolol succinate ER 50 milliGRAM(s) Oral daily  polyethylene glycol 3350 17 Gram(s) Oral daily  sacubitril 49 mG/valsartan 51 mG 1 Tablet(s) Oral two times a day  senna 2 Tablet(s) Oral at bedtime  torsemide 20 milliGRAM(s) Oral daily    MEDICATIONS  (PRN):  ipratropium    for Nebulization 500 MICROGram(s) Nebulizer every 6 hours PRN Shortness of Breath and/or Wheezing  LORazepam     Tablet 0.5 milliGRAM(s) Oral two times a day PRN Anxiety      Allergies    No Known Allergies    Intolerances        OBJECTIVE:  Vital Signs Last 24 Hrs  T(C): 36.3 (11 Aug 2023 08:27), Max: 36.7 (10 Aug 2023 20:13)  T(F): 97.3 (11 Aug 2023 08:27), Max: 98 (10 Aug 2023 20:13)  HR: 74 (11 Aug 2023 14:14) (68 - 87)  BP: 143/78 (11 Aug 2023 14:14) (123/69 - 163/71)  BP(mean): 110 (11 Aug 2023 08:27) (90 - 110)  RR: 18 (11 Aug 2023 14:14) (16 - 18)  SpO2: 93% (11 Aug 2023 14:14) (92% - 100%)    Parameters below as of 11 Aug 2023 14:14  Patient On (Oxygen Delivery Method): room air      I&O's Summary    10 Aug 2023 07:01  -  11 Aug 2023 07:00  --------------------------------------------------------  IN: 180 mL / OUT: 650 mL / NET: -470 mL    11 Aug 2023 07:01  -  11 Aug 2023 17:08  --------------------------------------------------------  IN: 360 mL / OUT: 300 mL / NET: 60 mL        PHYSICAL EXAM:  Gen: Reclining in bed at time of exam, appears stated age  HEENT: NCAT, MMM, clear OP  Neck: supple, trachea at midline  CV: RRR, +S1/S2  Pulm: adequate respiratory effort, no increase in work of breathing  Abd: soft, NTND  Skin: warm and dry,   Ext: WWP, no LE edema  Neuro: AOx3, no gross focal neurological deficits  Psych: affect and behavior appropriate, pleasant at time of interview  :     LABS:                        11.9   7.84  )-----------( 132      ( 11 Aug 2023 06:14 )             37.1     08-11    135  |  95<L>  |  36<H>  ----------------------------<  88  4.5   |    30  |  1.20    Ca    8.6      11 Aug 2023 06:14  Mg     2.2         TPro  6.6  /  Alb  3.5  /  TBili  0.7  /  DBili  x   /  AST  44<H>  /  ALT  51<H>  /  AlkPhos  114  08-10    LIVER FUNCTIONS - ( 10 Aug 2023 05:30 )  Alb: 3.5 g/dL / Pro: 6.6 g/dL / ALK PHOS: 114 U/L / ALT: 51 U/L / AST: 44 U/L / GGT: x             CAPILLARY BLOOD GLUCOSE        Urinalysis Basic - ( 11 Aug 2023 10:35 )    Color: Yellow / Appearance: Clear / S.015 / pH: x  Gluc: x / Ketone: NEGATIVE  / Bili: Negative / Urobili: 0.2 E.U./dL   Blood: x / Protein: NEGATIVE mg/dL / Nitrite: POSITIVE   Leuk Esterase: Trace / RBC: < 5 /HPF / WBC < 5 /HPF   Sq Epi: x / Non Sq Epi: x / Bacteria: Present /HPF        MICRODATA:      RADIOLOGY/OTHER STUDIES:

## 2023-08-11 NOTE — PROGRESS NOTE ADULT - PROBLEM SELECTOR PLAN 5
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 Richardson

## 2023-08-11 NOTE — PROGRESS NOTE ADULT - PROBLEM SELECTOR PLAN 3
Echo w/ severe MR, mod TR/NC, bioprosthetic AVR apparent normal function.  -BP control w/ Entresto 49/51mg BID and Toprol  -c/w Torsemide 20 mg daily starting 8/11/23  -MISAEL done today for evaluation - severe MR   - SHD eval for possible Mitraclip if aligned w/ pt's GOC. Will discuss w/ outpt cardiologist Dr Lopez

## 2023-08-12 ENCOUNTER — TRANSCRIPTION ENCOUNTER (OUTPATIENT)
Age: 88
End: 2023-08-12

## 2023-08-14 ENCOUNTER — TRANSCRIPTION ENCOUNTER (OUTPATIENT)
Age: 88
End: 2023-08-14

## 2023-08-14 ENCOUNTER — APPOINTMENT (OUTPATIENT)
Dept: CARE COORDINATION | Facility: HOME HEALTH | Age: 88
End: 2023-08-14
Payer: MEDICARE

## 2023-08-14 DIAGNOSIS — I50.20 UNSPECIFIED SYSTOLIC (CONGESTIVE) HEART FAILURE: ICD-10-CM

## 2023-08-14 DIAGNOSIS — I34.0 NONRHEUMATIC MITRAL (VALVE) INSUFFICIENCY: ICD-10-CM

## 2023-08-14 DIAGNOSIS — I10 ESSENTIAL (PRIMARY) HYPERTENSION: ICD-10-CM

## 2023-08-14 DIAGNOSIS — I48.0 PAROXYSMAL ATRIAL FIBRILLATION: ICD-10-CM

## 2023-08-14 PROBLEM — Z95.0 PRESENCE OF CARDIAC PACEMAKER: Chronic | Status: ACTIVE | Noted: 2023-08-08

## 2023-08-14 PROBLEM — I48.91 UNSPECIFIED ATRIAL FIBRILLATION: Chronic | Status: ACTIVE | Noted: 2023-08-08

## 2023-08-14 PROBLEM — Z00.00 ENCOUNTER FOR PREVENTIVE HEALTH EXAMINATION: Status: ACTIVE | Noted: 2023-08-14

## 2023-08-14 LAB
-  CLINDAMYCIN: SIGNIFICANT CHANGE UP
-  LEVOFLOXACIN: SIGNIFICANT CHANGE UP
CULTURE RESULTS: SIGNIFICANT CHANGE UP
METHOD TYPE: SIGNIFICANT CHANGE UP
ORGANISM # SPEC MICROSCOPIC CNT: SIGNIFICANT CHANGE UP
ORGANISM # SPEC MICROSCOPIC CNT: SIGNIFICANT CHANGE UP
SPECIMEN SOURCE: SIGNIFICANT CHANGE UP

## 2023-08-14 PROCEDURE — 99442: CPT | Mod: 95

## 2023-08-14 RX ORDER — SACUBITRIL AND VALSARTAN 49; 51 MG/1; MG/1
49-51 TABLET, FILM COATED ORAL
Refills: 0 | Status: ACTIVE | COMMUNITY

## 2023-08-14 RX ORDER — AMIODARONE HYDROCHLORIDE 200 MG/1
200 TABLET ORAL
Refills: 0 | Status: ACTIVE | COMMUNITY

## 2023-08-14 RX ORDER — APIXABAN 2.5 MG/1
2.5 TABLET, FILM COATED ORAL
Refills: 0 | Status: ACTIVE | COMMUNITY

## 2023-08-14 RX ORDER — METOPROLOL SUCCINATE 50 MG/1
50 TABLET, EXTENDED RELEASE ORAL
Refills: 0 | Status: ACTIVE | COMMUNITY

## 2023-08-14 RX ORDER — TORSEMIDE 20 MG/1
20 TABLET ORAL
Refills: 0 | Status: ACTIVE | COMMUNITY

## 2023-08-14 RX ORDER — LORAZEPAM 1 MG/1
1 TABLET ORAL
Refills: 0 | Status: ACTIVE | COMMUNITY

## 2023-08-14 RX ORDER — ATORVASTATIN CALCIUM 10 MG/1
10 TABLET, FILM COATED ORAL
Refills: 0 | Status: ACTIVE | COMMUNITY

## 2023-08-14 NOTE — ASSESSMENT
[FreeTextEntry1] : Patient is a 89 year old female enrolled in the [employer] program with a history of HTN, pAtrial Fibrillation (on Eliquis 2.5mg), recently diagnosed with HFrEF (EF 40%), severe MR, Medtronic PPM (s/p gen change 2022 @ [facility]), bioprosthetic AVR 2009 @ [facility]. Patient was admitted to [facility] for a diagnosis of CHF.   Hospital chart reviewed and copied as per [facility] and the [facility] Discharge Summary: "89 y/o Cantonese speaking female, ambulates with walker, has HHA 5 days a week 6hrs a day with PMHx of HTN, pAtrial Fibrillation (on Eliquis 2.5mg), recently diagnosed with HFrEF (EF 40%), severe MR, Medtronic PPM (s/p gen change 2022 @ [facility]), bioprosthetic AVR 2009 @ [facility], presented to [facility] ER 08/08/23, accompanied by daughter, with PEREZ and b/l UE & LE swelling and was admitted to the Cardiac Tele Unit for acute on chronic HFrEF exacerbation and Atrial Flutter @ 123 bpm s/p diuresis by Lasix 40 mg IV BID and PPM interrogation revealed she has been in A-fib since 07/29/23. Dr. Lopez, the [facility] outpatient Cardiologist, discussed with the patient and her family the importance of acquiring a MISAEL for both Cardioversion for A-fib as well as evaluation of severe MR for possible Mitraclip. MISAEL (08/10/23) revealed EF 30-35% w/ global hypokinesis, dilated LA, reduced RVSF, severe MR, bioprosthetic valve noted in aortic position no AR, severe non-mobile plaque seen in descending aorta and aortic arch. Cardioversion done on 08/10/23 was only momentarily successful and patient returned back into A-fib. Patient was loaded with Digoxin 250 mcg on 08/10/23 and 125 mcg AM of 08/11/23. Dig level of 1.2 on 08/11/23. Patient seen by EP, recommended to change to Amiodarone 200 mg daily, with first dose 8/11/2023. Patient continues to fluctuate between NSR and A-fib in 70s post Digoxin 125 mcg. EP considered an AVN ablation and BiV upgrade and discussed with patient daughter but does not believe is necessary at this time and to continue Amiodarone therapy. Lasix 40 mg IV twice daily was converted to Torsemide 20 mg twice daily. And to be discharged with Eliquis 2.5 mg twice daily, Amiodarone 200 mg daily, Toprol 50 mg daily, Entresto 49mg/51mg daily, Lipitor 10 mg daily and Torsemide 20 mg BID. Patient scheduled to see her Dr. Lopez on 08/13/23 and to check creatinine level (1.20 on discharge after 2 days of IV diuresis) as well as evaluation for possible Mitraclip in the future for Mitral Regurgitation. Patient noted to have asymptomatic 3 NSVT night of 8/10/22. Repeat EKG without ischemic changes. Patient has been medically cleared for discharge as per Dr. Richardson in coordination with Dr. Wolff. Medications that patient needs refills on have been e-prescribed to preferred pharmacy".   Patient observed via telephonic visit as she was unable to connect for scheduled tele visit via [email or web address]. Patient with daughter Ileana and is alert and oriented x 3, in no acute distress. Patient states they are feeling "better" today. Daughter Ileana states the patient got "some more rest today and is feeling better than she did this morning". Ileana reports compliance with medications. Patient was seen by cardiology Dr. Lopez yesterday 8/13/23 and is waiting for scheduled Christine clip procedure for severe MRChirag Tejeda reports the patient was previously experiencing some mild diarrhea but has since improved. Patient states they are eating and drinking well. Denies any cough, fever, chills, abdominal pain, palpitations, nausea, vomiting, lightheadedness, dizziness, shortness of breath, or chest pain.

## 2023-08-14 NOTE — PLAN
[FreeTextEntry1] : -CV and pulmonary status stable -Patient advised to continue with medication regimen as directed  -Medication education discussed in full detail with + teach back.  -Encouraged verbalization of fears and concerns.  -Report all symptoms not relieved by rest or medication -Educated on monitoring blood pressure -Maintain Balanced diet  -Exercise as appropriate -Patient educated on s/s of when to call medical providers with + teach back.  -Reminded of NP role and advised to call with any questions/concerns.  -Follow up with medical providers as scheduled  - Patient verbalized understanding of plan above, advised to call call TCM Team or CCC with any questions or concerns.

## 2023-08-14 NOTE — REVIEW OF SYSTEMS
[Fever] : no fever [Chills] : no chills [Fatigue] : fatigue [Hot Flashes] : no hot flashes [Night Sweats] : no night sweats [Chest Pain] : no chest pain [Palpitations] : no palpitations [Leg Claudication] : no leg claudication [Lower Ext Edema] : no lower extremity edema [Orthopnea] : no orthopnea [Shortness Of Breath] : no shortness of breath [Wheezing] : no wheezing [Cough] : no cough [Dyspnea on Exertion] : no dyspnea on exertion [Abdominal Pain] : no abdominal pain [Nausea] : no nausea [Constipation] : no constipation [Diarrhea] : diarrhea [Vomiting] : no vomiting [Dysuria] : no dysuria [Incontinence] : no incontinence [Hematuria] : no hematuria [Joint Pain] : no joint pain [Joint Stiffness] : no joint stiffness [Joint Swelling] : no joint swelling [Muscle Pain] : no muscle pain [Itching] : no itching [Skin Rash] : no skin rash [Headache] : no headache [Dizziness] : no dizziness [Fainting] : no fainting [Memory Loss] : no memory loss [Suicidal] : not suicidal [Insomnia] : no insomnia [Anxiety] : no anxiety [Easy Bleeding] : no easy bleeding [Negative] : ENT

## 2023-08-14 NOTE — HISTORY OF PRESENT ILLNESS
[Home] : at home, [unfilled] , at the time of the visit. [Other Location: e.g. Home (Enter Location, City,State)___] : at [unfilled] [Verbal consent obtained from patient] : the patient, [unfilled] [Family Member] : family member [FreeTextEntry1] : Follow-up for discharge from [facility] for CHF. [de-identified] : Patient is a 89 year old female enrolled in the [employer] program with a history of HTN, pAtrial Fibrillation (on Eliquis 2.5mg), recently diagnosed with HFrEF (EF 40%), severe MR, Medtronic PPM (s/p gen change 2022 @ [facility]), bioprosthetic AVR 2009 @ [facility]. Patient was admitted to [facility] for a diagnosis of CHF.   Hospital chart reviewed and copied as per [facility] Discharge Summary: "89 y/o Cantonese speaking female, ambulates with walker, has HHA 5 days a week 6hrs a day with PMHx of HTN, pAtrial Fibrillation (on Eliquis 2.5mg), recently diagnosed with HFrEF (EF 40%), severe MR, Medtronic PPM (s/p gen change 2022 @ [facility]), bioprosthetic AVR 2009 @ [facility], presented to [facility] 08/08/23, accompanied by daughter, with PEREZ and b/l UE & LE swelling and was admitted to the Cardiac Tele Unit for acute on chronic HFrEF exacerbation and Atrial Flutter @ 123 bpm s/p diuresis by Lasix 40 mg IV BID and PPM interrogation revealed she has been in A-fib since 07/29/23. Dr. Lopez, the [facility] outpatient Cardiologist, discussed with the patient and her family the importance of acquiring a MISAEL for both Cardioversion for A-fib as well as evaluation of severe MR for possible Mitraclip. MISAEL (08/10/23) revealed EF 30-35% w/ global hypokinesis, dilated LA, reduced RVSF, severe MR, bioprosthetic valve noted in aortic position no AR, severe non-mobile plaque seen in descending aorta and aortic arch. Cardioversion done on 08/10/23 was only momentarily successful and patient returned back into A-fib. Patient was loaded with Digoxin 250 mcg on 08/10/23 and 125 mcg AM of 08/11/23. Dig level of 1.2 on 08/11/23. Patient seen by EP, recommended to change to Amiodarone 200 mg daily, with first dose 8/11/2023. Patient continues to fluctuate between NSR and A-fib in 70s post Digoxin 125 mcg. EP considered an AVN ablation and BiV upgrade and discussed with patient daughter but does not believe is necessary at this time and to continue Amiodarone therapy. Lasix 40 mg IV twice daily was converted to Torsemide 20 mg twice daily. And to be discharged with Eliquis 2.5 mg twice daily, Amiodarone 200 mg daily, Toprol 50 mg daily, Entresto 49mg/51mg daily, Lipitor 10 mg daily and Torsemide 20 mg BID. Patient scheduled to see her Dr. Lopez on 08/13/23 and to check creatinine level (1.20 on discharge after 2 days of IV diuresis) as well as evaluation for possible Mitraclip in the future for Mitral Regurgitation. Patient noted to have asymptomatic 3 NSVT night of 8/10/22. Repeat EKG without ischemic changes. Patient has been medically cleared for discharge as per Dr. Richardson in coordination with Dr. Wolff. Medications that patient needs refills on have been e-prescribed to preferred pharmacy".   Patient observed via telephonic visit as she was unable to connect for scheduled tele visit via [employer]. Patient with daughter Ileana and is alert and oriented x 3, in no acute distress. Patient states they are feeling "better" today. Daughter Ileana states the patient got "some more rest today and is feeling better than she did this morning". Ileana reports compliance with medications. Patient was seen by cardiology Dr. Lopez yesterday 8/13/23 and is waiting for scheduled Christine clip procedure for severe MRChirag Tejeda reports the patient was previously experiencing some mild diarrhea but has since improved. Patient states they are eating and drinking well. Denies any cough, fever, chills, abdominal pain, palpitations, nausea, vomiting, lightheadedness, dizziness, shortness of breath, or chest pain.

## 2023-08-14 NOTE — PHYSICAL EXAM
[No Acute Distress] : no acute distress [No Respiratory Distress] : no respiratory distress  [Normal Affect] : the affect was normal [Normal Insight/Judgement] : insight and judgment were intact [de-identified] : limited due to telephonic visit  [de-identified] : walker to ambulate

## 2023-08-15 DIAGNOSIS — Z95.0 PRESENCE OF CARDIAC PACEMAKER: ICD-10-CM

## 2023-08-15 DIAGNOSIS — I48.19 OTHER PERSISTENT ATRIAL FIBRILLATION: ICD-10-CM

## 2023-08-17 DIAGNOSIS — Z79.02 LONG TERM (CURRENT) USE OF ANTITHROMBOTICS/ANTIPLATELETS: ICD-10-CM

## 2023-08-17 DIAGNOSIS — K59.00 CONSTIPATION, UNSPECIFIED: ICD-10-CM

## 2023-08-17 DIAGNOSIS — I42.8 OTHER CARDIOMYOPATHIES: ICD-10-CM

## 2023-08-17 DIAGNOSIS — I48.0 PAROXYSMAL ATRIAL FIBRILLATION: ICD-10-CM

## 2023-08-17 DIAGNOSIS — I08.1 RHEUMATIC DISORDERS OF BOTH MITRAL AND TRICUSPID VALVES: ICD-10-CM

## 2023-08-17 DIAGNOSIS — I50.23 ACUTE ON CHRONIC SYSTOLIC (CONGESTIVE) HEART FAILURE: ICD-10-CM

## 2023-08-17 DIAGNOSIS — I11.0 HYPERTENSIVE HEART DISEASE WITH HEART FAILURE: ICD-10-CM

## 2023-08-17 DIAGNOSIS — I48.92 UNSPECIFIED ATRIAL FLUTTER: ICD-10-CM

## 2023-08-17 DIAGNOSIS — Z95.2 PRESENCE OF PROSTHETIC HEART VALVE: ICD-10-CM

## 2023-08-17 DIAGNOSIS — R26.9 UNSPECIFIED ABNORMALITIES OF GAIT AND MOBILITY: ICD-10-CM

## 2023-08-17 DIAGNOSIS — I70.0 ATHEROSCLEROSIS OF AORTA: ICD-10-CM

## 2023-08-17 DIAGNOSIS — J96.01 ACUTE RESPIRATORY FAILURE WITH HYPOXIA: ICD-10-CM

## 2023-08-17 DIAGNOSIS — Z95.0 PRESENCE OF CARDIAC PACEMAKER: ICD-10-CM

## 2023-08-17 DIAGNOSIS — E87.1 HYPO-OSMOLALITY AND HYPONATREMIA: ICD-10-CM

## 2023-08-17 DIAGNOSIS — K76.1 CHRONIC PASSIVE CONGESTION OF LIVER: ICD-10-CM

## 2023-08-21 VITALS
DIASTOLIC BLOOD PRESSURE: 98 MMHG | HEART RATE: 82 BPM | WEIGHT: 104.94 LBS | OXYGEN SATURATION: 97 % | TEMPERATURE: 97 F | SYSTOLIC BLOOD PRESSURE: 176 MMHG | HEIGHT: 61 IN | RESPIRATION RATE: 12 BRPM

## 2023-08-21 RX ORDER — CHLORHEXIDINE GLUCONATE 213 G/1000ML
1 SOLUTION TOPICAL ONCE
Refills: 0 | Status: DISCONTINUED | OUTPATIENT
Start: 2023-08-23 | End: 2023-09-06

## 2023-08-21 NOTE — H&P ADULT - ASSESSMENT
89F Cantonese-speaking, ambulates with walker and has HHA 5 days a week 6hrs a day with PMHx of HTN, pAFib (on Eliquis, last dose 8/20/23),  HFrEF (now with EF 30-35% vs 40% on 1/23/22), severe MR, Medtronic PPM (s/p gen change 2022 @ [facility]), bioAVR (2009, [place]), and recent admission at [facility] (08/08/23-08/11/23) for acute decompensated HFrEF and Afib RVR s/p failed DCCV, s/p Digoxin and Amiodarone initiation, presents to [facility] for L/RHC for further evaluation and how best to manage her severe MR (i.e. medical management vs. MitraClip consideration).     - ASA III, Mallampati III  - H 12.7/H 37.6. Pt takes ASA 81mg daily (last dose 8/23/23) and Eliquis 2.5mg BID (last dose 8/20/23); denies bleeding, GI bleeding, hematemesis, hematuria, BRBPR or melena. No further load given L/RHC procedure   - Cr 1.25; euvolemic on exam, last EF 30-35% on TTEE. Fluids not indicated  - Interaction with patient and consent attained with aid of Cantonese  ID #[id number]    Risks & benefits of procedure and alternative therapy have been explained to the patient including but not limited to: allergic reaction, bleeding w/possible need for blood transfusion, infection, renal and vascular compromise, limb damage, arrhythmia, stroke, vessel dissection/perforation, Myocardial infarction, emergent CABG. Informed consent obtained and in chart

## 2023-08-21 NOTE — H&P ADULT - NSHPLABSRESULTS_GEN_ALL_CORE
12.7   7.34  )-----------( 154      ( 23 Aug 2023 09:15 )             37.6     08-23    131<L>  |  92<L>  |  36<H>  ----------------------------<  122<H>  4.4   |  27  |  1.25    Ca    9.2      23 Aug 2023 09:15    TPro  7.5  /  Alb  4.0  /  TBili  0.7  /  DBili  x   /  AST  21  /  ALT  9<L>  /  AlkPhos  75  08-23    PT/INR - ( 23 Aug 2023 09:15 )   PT: 10.6 sec;   INR: 0.93          PTT - ( 23 Aug 2023 09:15 )  PTT:30.0 sec

## 2023-08-21 NOTE — H&P ADULT - NSICDXPASTMEDICALHX_GEN_ALL_CORE_FT
PAST MEDICAL HISTORY:  Atrial fibrillation     Cardiac pacemaker     Chronic HFrEF (heart failure with reduced ejection fraction)     HTN (hypertension)     Severe mitral regurgitation

## 2023-08-23 ENCOUNTER — OUTPATIENT (OUTPATIENT)
Dept: OUTPATIENT SERVICES | Facility: HOSPITAL | Age: 88
LOS: 1 days | Discharge: HOME | End: 2023-08-23
Payer: MEDICARE

## 2023-08-23 ENCOUNTER — TRANSCRIPTION ENCOUNTER (OUTPATIENT)
Age: 88
End: 2023-08-23

## 2023-08-23 DIAGNOSIS — Z96.649 PRESENCE OF UNSPECIFIED ARTIFICIAL HIP JOINT: Chronic | ICD-10-CM

## 2023-08-23 DIAGNOSIS — Z95.0 PRESENCE OF CARDIAC PACEMAKER: Chronic | ICD-10-CM

## 2023-08-23 DIAGNOSIS — Z95.2 PRESENCE OF PROSTHETIC HEART VALVE: Chronic | ICD-10-CM

## 2023-08-23 LAB
A1C WITH ESTIMATED AVERAGE GLUCOSE RESULT: 6 % — HIGH (ref 4–5.6)
ALBUMIN SERPL ELPH-MCNC: 4 G/DL — SIGNIFICANT CHANGE UP (ref 3.3–5)
ALP SERPL-CCNC: 75 U/L — SIGNIFICANT CHANGE UP (ref 40–120)
ALT FLD-CCNC: 9 U/L — LOW (ref 10–45)
ANION GAP SERPL CALC-SCNC: 10 MMOL/L — SIGNIFICANT CHANGE UP (ref 5–17)
ANION GAP SERPL CALC-SCNC: 12 MMOL/L — SIGNIFICANT CHANGE UP (ref 5–17)
APTT BLD: 30 SEC — SIGNIFICANT CHANGE UP (ref 24.5–35.6)
AST SERPL-CCNC: 21 U/L — SIGNIFICANT CHANGE UP (ref 10–40)
BASOPHILS # BLD AUTO: 0.05 K/UL — SIGNIFICANT CHANGE UP (ref 0–0.2)
BASOPHILS NFR BLD AUTO: 0.7 % — SIGNIFICANT CHANGE UP (ref 0–2)
BILIRUB SERPL-MCNC: 0.7 MG/DL — SIGNIFICANT CHANGE UP (ref 0.2–1.2)
BUN SERPL-MCNC: 34 MG/DL — HIGH (ref 7–23)
BUN SERPL-MCNC: 36 MG/DL — HIGH (ref 7–23)
CALCIUM SERPL-MCNC: 9.1 MG/DL — SIGNIFICANT CHANGE UP (ref 8.4–10.5)
CALCIUM SERPL-MCNC: 9.2 MG/DL — SIGNIFICANT CHANGE UP (ref 8.4–10.5)
CHLORIDE SERPL-SCNC: 92 MMOL/L — LOW (ref 96–108)
CHLORIDE SERPL-SCNC: 92 MMOL/L — LOW (ref 96–108)
CHOLEST SERPL-MCNC: 192 MG/DL — SIGNIFICANT CHANGE UP
CK MB CFR SERPL CALC: 2.2 NG/ML — SIGNIFICANT CHANGE UP (ref 0–6.7)
CK SERPL-CCNC: 73 U/L — SIGNIFICANT CHANGE UP (ref 25–170)
CO2 SERPL-SCNC: 27 MMOL/L — SIGNIFICANT CHANGE UP (ref 22–31)
CO2 SERPL-SCNC: 28 MMOL/L — SIGNIFICANT CHANGE UP (ref 22–31)
COHGB MFR BLDA: 2.2 % — SIGNIFICANT CHANGE UP
COHGB MFR BLDV: 1.8 % — SIGNIFICANT CHANGE UP
COHGB MFR BLDV: 2 % — SIGNIFICANT CHANGE UP
CREAT SERPL-MCNC: 1.25 MG/DL — SIGNIFICANT CHANGE UP (ref 0.5–1.3)
CREAT SERPL-MCNC: 1.26 MG/DL — SIGNIFICANT CHANGE UP (ref 0.5–1.3)
EGFR: 40 ML/MIN/1.73M2 — LOW
EGFR: 40 ML/MIN/1.73M2 — LOW
EOSINOPHIL # BLD AUTO: 0.08 K/UL — SIGNIFICANT CHANGE UP (ref 0–0.5)
EOSINOPHIL NFR BLD AUTO: 1.1 % — SIGNIFICANT CHANGE UP (ref 0–6)
ESTIMATED AVERAGE GLUCOSE: 126 MG/DL — HIGH (ref 68–114)
GLUCOSE SERPL-MCNC: 116 MG/DL — HIGH (ref 70–99)
GLUCOSE SERPL-MCNC: 122 MG/DL — HIGH (ref 70–99)
HCT VFR BLD CALC: 37.2 % — SIGNIFICANT CHANGE UP (ref 34.5–45)
HCT VFR BLD CALC: 37.6 % — SIGNIFICANT CHANGE UP (ref 34.5–45)
HCT VFR BLDA CALC: 37 % — SIGNIFICANT CHANGE UP
HCT VFR BLDA CALC: 37 % — SIGNIFICANT CHANGE UP
HDLC SERPL-MCNC: 80 MG/DL — SIGNIFICANT CHANGE UP
HGB BLD CALC-MCNC: 12.2 G/DL — SIGNIFICANT CHANGE UP (ref 11.7–16.1)
HGB BLD CALC-MCNC: 12.2 G/DL — SIGNIFICANT CHANGE UP (ref 11.7–16.1)
HGB BLD-MCNC: 12.5 G/DL — SIGNIFICANT CHANGE UP (ref 11.5–15.5)
HGB BLD-MCNC: 12.7 G/DL — SIGNIFICANT CHANGE UP (ref 11.5–15.5)
HGB BLDA-MCNC: 12.1 G/DL — SIGNIFICANT CHANGE UP (ref 11.7–16.1)
IMM GRANULOCYTES NFR BLD AUTO: 0.3 % — SIGNIFICANT CHANGE UP (ref 0–0.9)
INR BLD: 0.93 — SIGNIFICANT CHANGE UP (ref 0.85–1.18)
ISTAT ACTK (ACTIVATED CLOTTING TIME KAOLIN): 299 SEC — HIGH (ref 74–137)
LIPID PNL WITH DIRECT LDL SERPL: 96 MG/DL — SIGNIFICANT CHANGE UP
LYMPHOCYTES # BLD AUTO: 1.23 K/UL — SIGNIFICANT CHANGE UP (ref 1–3.3)
LYMPHOCYTES # BLD AUTO: 16.8 % — SIGNIFICANT CHANGE UP (ref 13–44)
MAGNESIUM SERPL-MCNC: 2.2 MG/DL — SIGNIFICANT CHANGE UP (ref 1.6–2.6)
MCHC RBC-ENTMCNC: 30 PG — SIGNIFICANT CHANGE UP (ref 27–34)
MCHC RBC-ENTMCNC: 30.2 PG — SIGNIFICANT CHANGE UP (ref 27–34)
MCHC RBC-ENTMCNC: 33.6 GM/DL — SIGNIFICANT CHANGE UP (ref 32–36)
MCHC RBC-ENTMCNC: 33.8 GM/DL — SIGNIFICANT CHANGE UP (ref 32–36)
MCV RBC AUTO: 89.3 FL — SIGNIFICANT CHANGE UP (ref 80–100)
MCV RBC AUTO: 89.4 FL — SIGNIFICANT CHANGE UP (ref 80–100)
METHGB MFR BLDA: 1 % — SIGNIFICANT CHANGE UP
METHGB MFR BLDV: 0.6 % — SIGNIFICANT CHANGE UP
METHGB MFR BLDV: 0.9 % — SIGNIFICANT CHANGE UP
MONOCYTES # BLD AUTO: 0.44 K/UL — SIGNIFICANT CHANGE UP (ref 0–0.9)
MONOCYTES NFR BLD AUTO: 6 % — SIGNIFICANT CHANGE UP (ref 2–14)
NEUTROPHILS # BLD AUTO: 5.52 K/UL — SIGNIFICANT CHANGE UP (ref 1.8–7.4)
NEUTROPHILS NFR BLD AUTO: 75.1 % — SIGNIFICANT CHANGE UP (ref 43–77)
NON HDL CHOLESTEROL: 112 MG/DL — SIGNIFICANT CHANGE UP
NRBC # BLD: 0 /100 WBCS — SIGNIFICANT CHANGE UP (ref 0–0)
NRBC # BLD: 0 /100 WBCS — SIGNIFICANT CHANGE UP (ref 0–0)
OXYHGB MFR BLDA: 94.6 % — SIGNIFICANT CHANGE UP (ref 90–95)
PLATELET # BLD AUTO: 150 K/UL — SIGNIFICANT CHANGE UP (ref 150–400)
PLATELET # BLD AUTO: 154 K/UL — SIGNIFICANT CHANGE UP (ref 150–400)
POTASSIUM SERPL-MCNC: 4.1 MMOL/L — SIGNIFICANT CHANGE UP (ref 3.5–5.3)
POTASSIUM SERPL-MCNC: 4.4 MMOL/L — SIGNIFICANT CHANGE UP (ref 3.5–5.3)
POTASSIUM SERPL-SCNC: 4.1 MMOL/L — SIGNIFICANT CHANGE UP (ref 3.5–5.3)
POTASSIUM SERPL-SCNC: 4.4 MMOL/L — SIGNIFICANT CHANGE UP (ref 3.5–5.3)
PROT SERPL-MCNC: 7.5 G/DL — SIGNIFICANT CHANGE UP (ref 6–8.3)
PROTHROM AB SERPL-ACNC: 10.6 SEC — SIGNIFICANT CHANGE UP (ref 9.5–13)
RBC # BLD: 4.16 M/UL — SIGNIFICANT CHANGE UP (ref 3.8–5.2)
RBC # BLD: 4.21 M/UL — SIGNIFICANT CHANGE UP (ref 3.8–5.2)
RBC # FLD: 13.3 % — SIGNIFICANT CHANGE UP (ref 10.3–14.5)
RBC # FLD: 13.4 % — SIGNIFICANT CHANGE UP (ref 10.3–14.5)
SAO2 % BLDA: 97.7 % — SIGNIFICANT CHANGE UP (ref 94–98)
SAO2 % BLDV: 77 % — SIGNIFICANT CHANGE UP (ref 67–88)
SAO2 % BLDV: 77 % — SIGNIFICANT CHANGE UP (ref 67–88)
SODIUM SERPL-SCNC: 130 MMOL/L — LOW (ref 135–145)
SODIUM SERPL-SCNC: 131 MMOL/L — LOW (ref 135–145)
TRIGL SERPL-MCNC: 80 MG/DL — SIGNIFICANT CHANGE UP
WBC # BLD: 6.34 K/UL — SIGNIFICANT CHANGE UP (ref 3.8–10.5)
WBC # BLD: 7.34 K/UL — SIGNIFICANT CHANGE UP (ref 3.8–10.5)
WBC # FLD AUTO: 6.34 K/UL — SIGNIFICANT CHANGE UP (ref 3.8–10.5)
WBC # FLD AUTO: 7.34 K/UL — SIGNIFICANT CHANGE UP (ref 3.8–10.5)

## 2023-08-23 PROCEDURE — 36415 COLL VENOUS BLD VENIPUNCTURE: CPT

## 2023-08-23 PROCEDURE — 80061 LIPID PANEL: CPT

## 2023-08-23 PROCEDURE — 83036 HEMOGLOBIN GLYCOSYLATED A1C: CPT

## 2023-08-23 PROCEDURE — C1887: CPT

## 2023-08-23 PROCEDURE — 99153 MOD SED SAME PHYS/QHP EA: CPT

## 2023-08-23 PROCEDURE — 93460 R&L HRT ART/VENTRICLE ANGIO: CPT | Mod: 26,59

## 2023-08-23 PROCEDURE — 85027 COMPLETE CBC AUTOMATED: CPT

## 2023-08-23 PROCEDURE — 99205 OFFICE O/P NEW HI 60 MIN: CPT

## 2023-08-23 PROCEDURE — 80048 BASIC METABOLIC PNL TOTAL CA: CPT

## 2023-08-23 PROCEDURE — C1753: CPT

## 2023-08-23 PROCEDURE — 85025 COMPLETE CBC W/AUTO DIFF WBC: CPT

## 2023-08-23 PROCEDURE — 80053 COMPREHEN METABOLIC PANEL: CPT

## 2023-08-23 PROCEDURE — C1725: CPT

## 2023-08-23 PROCEDURE — 82803 BLOOD GASES ANY COMBINATION: CPT

## 2023-08-23 PROCEDURE — 85730 THROMBOPLASTIN TIME PARTIAL: CPT

## 2023-08-23 PROCEDURE — C1894: CPT

## 2023-08-23 PROCEDURE — 99152 MOD SED SAME PHYS/QHP 5/>YRS: CPT

## 2023-08-23 PROCEDURE — 85610 PROTHROMBIN TIME: CPT

## 2023-08-23 PROCEDURE — 92928 PRQ TCAT PLMT NTRAC ST 1 LES: CPT | Mod: LD

## 2023-08-23 PROCEDURE — 82553 CREATINE MB FRACTION: CPT

## 2023-08-23 PROCEDURE — 93460 R&L HRT ART/VENTRICLE ANGIO: CPT | Mod: 59

## 2023-08-23 PROCEDURE — C1769: CPT

## 2023-08-23 PROCEDURE — C1874: CPT

## 2023-08-23 PROCEDURE — 85347 COAGULATION TIME ACTIVATED: CPT

## 2023-08-23 PROCEDURE — 92978 ENDOLUMINL IVUS OCT C 1ST: CPT | Mod: 26,LD

## 2023-08-23 PROCEDURE — C9600: CPT | Mod: LD

## 2023-08-23 PROCEDURE — 83735 ASSAY OF MAGNESIUM: CPT

## 2023-08-23 PROCEDURE — 82550 ASSAY OF CK (CPK): CPT

## 2023-08-23 PROCEDURE — 92978 ENDOLUMINL IVUS OCT C 1ST: CPT | Mod: LD

## 2023-08-23 RX ORDER — SODIUM CHLORIDE 9 MG/ML
500 INJECTION INTRAMUSCULAR; INTRAVENOUS; SUBCUTANEOUS
Refills: 0 | Status: DISCONTINUED | OUTPATIENT
Start: 2023-08-23 | End: 2023-09-06

## 2023-08-23 RX ORDER — CLOPIDOGREL BISULFATE 75 MG/1
1 TABLET, FILM COATED ORAL
Qty: 30 | Refills: 11
Start: 2023-08-23 | End: 2024-08-16

## 2023-08-23 RX ORDER — APIXABAN 2.5 MG/1
1 TABLET, FILM COATED ORAL
Qty: 60 | Refills: 11
Start: 2023-08-23 | End: 2024-08-16

## 2023-08-23 RX ADMIN — SODIUM CHLORIDE 50 MILLILITER(S): 9 INJECTION INTRAMUSCULAR; INTRAVENOUS; SUBCUTANEOUS at 14:26

## 2023-08-23 NOTE — CONSULT NOTE ADULT - SUBJECTIVE AND OBJECTIVE BOX
Surgeon/Attending MD: Dr. Chávez     Requesting Physician: Dr. Danial Mosher     HISTORY OF PRESENT ILLNESS:   89F Cantonese-speaking, ambulates with walker and has HHA 5 days a week 6hrs a day with PMHx of HTN, pAFib (on Eliquis, last dose 8/20/23),  HFrEF (now with EF 30-35%), severe MR, Medtronic PPM, bioAVR (2009, [place]), and recent admission at [facility] (08/08/23-08/11/23) for acute decompensated HFrEF and Afib RVR s/p failed DCCV, s/p Digoxin and Amiodarone initiation, presents to [facility] for L/RHC for further evaluation and how best to manage her severe MR (i.e. medical management vs. MitraClip). S/p Cardiac cath at this time, and CTS consulted for surgical evaluation. Currently Denies any chest pain, palpitations, orthopnea, dyspnea on exertion, shortness of breath, wheezing, abd pain, nausea, vomiting, constipation, lightheadedness, headaches, fevers, or chills.    PAST MEDICAL & SURGICAL HISTORY:  Atrial fibrillation  HTN (hypertension)  Cardiac pacemaker  Chronic HFrEF (heart failure with reduced ejection fraction)  Severe mitral regurgitation  Heart valve replace  S/P hip replacement  Cardiac pacemaker      MEDICATIONS  (STANDING):  chlorhexidine 4% Liquid 1 Application(s) Topical once  sodium chloride 0.9%. 500 milliLiter(s) (50 mL/Hr) IV Continuous <Continuous>    MEDICATIONS  (PRN):    Allergies  No Known Allergies  Intolerances    SOCIAL HISTORY:  no smoking, no ETOH    FAMILY HISTORY:  No pertinent family history in first degree relatives    Review of Systems:  CONSTITUTIONAL: Denies fevers / chills, sweats, fatigue, weight loss, weight gain                                       NEURO:  Denies parathesias, seizures, syncope, confusion                                                                                  EYES:  Denies blurry vision, discharge, pain, loss of vision                                                                                    ENMT:  Denies difficulty hearing, vertigo, dysphagia, epistaxis, recent dental work                                       CV:  Denies chest pain, palpitations, PEREZ, orthopnea                                                                                           RESPIRATORY:  Denies wWheezing, SOB, cough / sputum, hemoptysis                                                               GI:  Denies nausea, vomiting, diarrhea, constipation, melena                                                                          : Denies hematuria, dysuria, urgency, incontinence                                                                                          MUSKULOSKELETAL:  Denies arthritis, joint swelling, muscle weakness                                                             SKIN/BREAST:  Denies rash, itching, hair loss, masses                                                                                              PSYCH:  Denies depression, anxiety, suicidal ideation                                                                                                HEME/LYMPH:  Denies bruises easily, enlarged lymph nodes, tender lymph nodes                                          ENDOCRINE:  Denies cold intolerance, heat intolerance, polydipsia                                                                      Vital Signs Last 24 Hrs  T(C): --  T(F): --  HR: --  BP: --  BP(mean): --  RR: --  SpO2: --    PHYSICAL EXAM:  General: resting in stretcher, supine, NAD   Neurological: AOx3. Motor skills grossly intact  Cardiovascular: +murmur   Respiratory: Lungs CTA bilaterally. No wheezing or rales  Gastrointestinal:  Soft. Non-tender  Extremities: No edema. No calf tenderness.  Vascular: Radial 2+bilaterally, DP 2+ b/l  Incision Sites:                                                             LABS:                        12.7   7.34  )-----------( 154      ( 23 Aug 2023 09:15 )             37.6     08-23    131<L>  |  92<L>  |  36<H>  ----------------------------<  122<H>  4.4   |  27  |  1.25    Ca    9.2      23 Aug 2023 09:15    TPro  7.5  /  Alb  4.0  /  TBili  0.7  /  DBili  x   /  AST  21  /  ALT  9<L>  /  AlkPhos  75  08-23    PT/INR - ( 23 Aug 2023 09:15 )   PT: 10.6 sec;   INR: 0.93          PTT - ( 23 Aug 2023 09:15 )  PTT:30.0 sec  Urinalysis Basic - ( 23 Aug 2023 09:15 )    Color: x / Appearance: x / SG: x / pH: x  Gluc: 122 mg/dL / Ketone: x  / Bili: x / Urobili: x   Blood: x / Protein: x / Nitrite: x   Leuk Esterase: x / RBC: x / WBC x   Sq Epi: x / Non Sq Epi: x / Bacteria: x      CARDIAC MARKERS ( 23 Aug 2023 09:15 )  x     / x     / 73 U/L / x     / 2.2 ng/mL          RADIOLOGY & ADDITIONAL STUDIES:   Surgeon/Attending MD: Dr. Chávez     Requesting Physician: Dr. Danial Mosher     HISTORY OF PRESENT ILLNESS:   89F Cantonese-speaking, ambulates with walker and has HHA 5 days a week 6hrs a day with PMHx of HTN, pAFib (on Eliquis, last dose 8/20/23),  HFrEF (now with EF 30-35%), severe MR, Medtronic PPM, bioAVR (2009, [place]), and recent admission at [facility] (08/08/23-08/11/23) for acute decompensated HFrEF and Afib RVR s/p failed DCCV, s/p Digoxin and Amiodarone initiation, presents to [facility] for L/RHC for further evaluation and how best to manage her severe MR (i.e. medical management vs. MitraClip). S/p Cardiac cath at this time, and CTS consulted for surgical evaluation. Currently Denies any chest pain, palpitations, orthopnea, dyspnea on exertion, shortness of breath, wheezing, abd pain, nausea, vomiting, constipation, lightheadedness, headaches, fevers, or chills.    PAST MEDICAL & SURGICAL HISTORY:  Atrial fibrillation  HTN (hypertension)  Cardiac pacemaker  Chronic HFrEF (heart failure with reduced ejection fraction)  Severe mitral regurgitation  Heart valve replace  S/P hip replacement  Cardiac pacemaker      MEDICATIONS  (STANDING):  chlorhexidine 4% Liquid 1 Application(s) Topical once  sodium chloride 0.9%. 500 milliLiter(s) (50 mL/Hr) IV Continuous <Continuous>    MEDICATIONS  (PRN):    Allergies  No Known Allergies  Intolerances    SOCIAL HISTORY:  no smoking, no ETOH    FAMILY HISTORY:  No pertinent family history in first degree relatives    Review of Systems:  CONSTITUTIONAL: Denies fevers / chills, sweats, fatigue, weight loss, weight gain                                       NEURO:  Denies parathesias, seizures, syncope, confusion                                                                                  EYES:  Denies blurry vision, discharge, pain, loss of vision                                                                                    ENMT:  Denies difficulty hearing, vertigo, dysphagia, epistaxis, recent dental work                                       CV:  Denies chest pain, palpitations, PEREZ, orthopnea                                                                                           RESPIRATORY:  Denies wWheezing, SOB, cough / sputum, hemoptysis                                                               GI:  Denies nausea, vomiting, diarrhea, constipation, melena                                                                          : Denies hematuria, dysuria, urgency, incontinence                                                                                          MUSKULOSKELETAL:  Denies arthritis, joint swelling, muscle weakness                                                             SKIN/BREAST:  Denies rash, itching, hair loss, masses                                                                                              PSYCH:  Denies depression, anxiety, suicidal ideation                                                                                                HEME/LYMPH:  Denies bruises easily, enlarged lymph nodes, tender lymph nodes                                          ENDOCRINE:  Denies cold intolerance, heat intolerance, polydipsia                                                                      Vital Signs Last 24 Hrs  T(C): --  T(F): --  HR: --  BP: --  BP(mean): --  RR: --  SpO2: --    PHYSICAL EXAM:  General: resting in stretcher, supine, NAD   Neurological: AOx3. Motor skills grossly intact  Cardiovascular: +murmur   Respiratory: Lungs CTA bilaterally. No wheezing or rales  Gastrointestinal:  Soft. Non-tender  Extremities: No edema. No calf tenderness.  Vascular: Radial 2+bilaterally, DP 2+ b/l  Incision Sites:                                                             LABS:                        12.7   7.34  )-----------( 154      ( 23 Aug 2023 09:15 )             37.6     08-23    131<L>  |  92<L>  |  36<H>  ----------------------------<  122<H>  4.4   |  27  |  1.25    Ca    9.2      23 Aug 2023 09:15    TPro  7.5  /  Alb  4.0  /  TBili  0.7  /  DBili  x   /  AST  21  /  ALT  9<L>  /  AlkPhos  75  08-23    PT/INR - ( 23 Aug 2023 09:15 )   PT: 10.6 sec;   INR: 0.93          PTT - ( 23 Aug 2023 09:15 )  PTT:30.0 sec  Urinalysis Basic - ( 23 Aug 2023 09:15 )    Color: x / Appearance: x / SG: x / pH: x  Gluc: 122 mg/dL / Ketone: x  / Bili: x / Urobili: x   Blood: x / Protein: x / Nitrite: x   Leuk Esterase: x / RBC: x / WBC x   Sq Epi: x / Non Sq Epi: x / Bacteria: x      CARDIAC MARKERS ( 23 Aug 2023 09:15 )  x     / x     / 73 U/L / x     / 2.2 ng/mL    RADIOLOGY & ADDITIONAL STUDIES:  No further imaging required on this hospital stay.  Surgeon/Attending MD: Dr. Chávez     Requesting Physician: Dr. Danial Mosher      ID: [id number] (Aleida), Cantonese    HISTORY OF PRESENT ILLNESS:   89F Cantonese-speaking, ambulates with walker and has HHA 5 days a week 6hrs a day with PMHx of HTN, pAFib (on Eliquis, last dose 8/20/23),  HFrEF (now with EF 30-35%), severe MR, Medtronic PPM, bioAVR (2009, [place]), and recent admission at [facility] (08/08/23-08/11/23) for acute decompensated HFrEF and Afib RVR s/p failed DCCV, s/p Digoxin and Amiodarone initiation, presents to [facility] for L/RHC for further evaluation and how best to manage her severe MR (i.e. medical management vs. MitraClip). S/p Cardiac cath at this time, and CTS consulted for surgical evaluation. Currently Denies any chest pain, palpitations, orthopnea, dyspnea on exertion, shortness of breath, wheezing, abd pain, nausea, vomiting, constipation, lightheadedness, headaches, fevers, or chills.    PAST MEDICAL & SURGICAL HISTORY:  Atrial fibrillation  HTN (hypertension)  Cardiac pacemaker  Chronic HFrEF (heart failure with reduced ejection fraction)  Severe mitral regurgitation  Heart valve replace  S/P hip replacement  Cardiac pacemaker      MEDICATIONS  (STANDING):  chlorhexidine 4% Liquid 1 Application(s) Topical once  sodium chloride 0.9%. 500 milliLiter(s) (50 mL/Hr) IV Continuous <Continuous>    MEDICATIONS  (PRN):    Allergies  No Known Allergies  Intolerances    SOCIAL HISTORY:  no smoking, no ETOH    FAMILY HISTORY:  No pertinent family history in first degree relatives    Review of Systems:  CONSTITUTIONAL: Denies fevers / chills, sweats, fatigue, weight loss, weight gain                                       NEURO:  Denies parathesias, seizures, syncope, confusion                                                                                  EYES:  Denies blurry vision, discharge, pain, loss of vision                                                                                    ENMT:  Denies difficulty hearing, vertigo, dysphagia, epistaxis, recent dental work                                       CV:  Denies chest pain, palpitations, PEREZ, orthopnea                                                                                           RESPIRATORY:  Denies wWheezing, SOB, cough / sputum, hemoptysis                                                               GI:  Denies nausea, vomiting, diarrhea, constipation, melena                                                                          : Denies hematuria, dysuria, urgency, incontinence                                                                                          MUSKULOSKELETAL:  Denies arthritis, joint swelling, muscle weakness                                                             SKIN/BREAST:  Denies rash, itching, hair loss, masses                                                                                              PSYCH:  Denies depression, anxiety, suicidal ideation                                                                                                HEME/LYMPH:  Denies bruises easily, enlarged lymph nodes, tender lymph nodes                                          ENDOCRINE:  Denies cold intolerance, heat intolerance, polydipsia                                                                      Vital Signs Last 24 Hrs  T(C): --  T(F): --  HR: --  BP: --  BP(mean): --  RR: --  SpO2: --    PHYSICAL EXAM:  General: resting in stretcher, supine, NAD   Neurological: AOx3. Motor skills grossly intact  Cardiovascular: +murmur   Respiratory: Lungs CTA bilaterally. No wheezing or rales  Gastrointestinal:  Soft. Non-tender  Extremities: No edema. No calf tenderness.  Vascular: Radial 2+bilaterally, DP 2+ b/l  Incision Sites:                                                             LABS:                        12.7   7.34  )-----------( 154      ( 23 Aug 2023 09:15 )             37.6     08-23    131<L>  |  92<L>  |  36<H>  ----------------------------<  122<H>  4.4   |  27  |  1.25    Ca    9.2      23 Aug 2023 09:15    TPro  7.5  /  Alb  4.0  /  TBili  0.7  /  DBili  x   /  AST  21  /  ALT  9<L>  /  AlkPhos  75  08-23    PT/INR - ( 23 Aug 2023 09:15 )   PT: 10.6 sec;   INR: 0.93          PTT - ( 23 Aug 2023 09:15 )  PTT:30.0 sec  Urinalysis Basic - ( 23 Aug 2023 09:15 )    Color: x / Appearance: x / SG: x / pH: x  Gluc: 122 mg/dL / Ketone: x  / Bili: x / Urobili: x   Blood: x / Protein: x / Nitrite: x   Leuk Esterase: x / RBC: x / WBC x   Sq Epi: x / Non Sq Epi: x / Bacteria: x      CARDIAC MARKERS ( 23 Aug 2023 09:15 )  x     / x     / 73 U/L / x     / 2.2 ng/mL    RADIOLOGY & ADDITIONAL STUDIES:  No further imaging required on this hospital stay.  Surgeon/Attending MD: Dr. Mosher    Requesting Physician: Dr. Danial Mosher      ID: [id number] (Aleida), Cantonese    HISTORY OF PRESENT ILLNESS:   89F Cantonese-speaking, ambulates with walker and has HHA 5 days a week 6hrs a day with PMHx of HTN, pAFib (on Eliquis, last dose 8/20/23),  HFrEF (now with EF 30-35%), severe MR, Medtronic PPM, bioAVR (2009, [place]), and recent admission at [facility] (08/08/23-08/11/23) for acute decompensated HFrEF and Afib RVR s/p failed DCCV, s/p Digoxin and Amiodarone initiation, presents to [facility] for L/RHC for further evaluation and how best to manage her severe MR (i.e. medical management vs. MitraClip). S/p Cardiac cath at this time, and CTS consulted for surgical evaluation. Currently Denies any chest pain, palpitations, orthopnea, dyspnea on exertion, shortness of breath, wheezing, abd pain, nausea, vomiting, constipation, lightheadedness, headaches, fevers, or chills.    PAST MEDICAL & SURGICAL HISTORY:  Atrial fibrillation  HTN (hypertension)  Cardiac pacemaker  Chronic HFrEF (heart failure with reduced ejection fraction)  Severe mitral regurgitation  Heart valve replace  S/P hip replacement  Cardiac pacemaker      MEDICATIONS  (STANDING):  chlorhexidine 4% Liquid 1 Application(s) Topical once  sodium chloride 0.9%. 500 milliLiter(s) (50 mL/Hr) IV Continuous <Continuous>    MEDICATIONS  (PRN):    Allergies  No Known Allergies  Intolerances    SOCIAL HISTORY:  no smoking, no ETOH    FAMILY HISTORY:  No pertinent family history in first degree relatives    Review of Systems:  CONSTITUTIONAL: Denies fevers / chills, sweats, fatigue, weight loss, weight gain                                       NEURO:  Denies parathesias, seizures, syncope, confusion                                                                                  EYES:  Denies blurry vision, discharge, pain, loss of vision                                                                                    ENMT:  Denies difficulty hearing, vertigo, dysphagia, epistaxis, recent dental work                                       CV:  Denies chest pain, palpitations, PEREZ, orthopnea                                                                                           RESPIRATORY:  Denies wWheezing, SOB, cough / sputum, hemoptysis                                                               GI:  Denies nausea, vomiting, diarrhea, constipation, melena                                                                          : Denies hematuria, dysuria, urgency, incontinence                                                                                          MUSKULOSKELETAL:  Denies arthritis, joint swelling, muscle weakness                                                             SKIN/BREAST:  Denies rash, itching, hair loss, masses                                                                                              PSYCH:  Denies depression, anxiety, suicidal ideation                                                                                                HEME/LYMPH:  Denies bruises easily, enlarged lymph nodes, tender lymph nodes                                          ENDOCRINE:  Denies cold intolerance, heat intolerance, polydipsia                                                                      Vital Signs Last 24 Hrs  T(C): --  T(F): --  HR: --  BP: --  BP(mean): --  RR: --  SpO2: --    PHYSICAL EXAM:  General: resting in stretcher, supine, NAD   Neurological: AOx3. Motor skills grossly intact  Cardiovascular: +murmur   Respiratory: Lungs CTA bilaterally. No wheezing or rales  Gastrointestinal:  Soft. Non-tender  Extremities: No edema. No calf tenderness.  Vascular: Radial 2+bilaterally, DP 2+ b/l  Incision Sites:                                                             LABS:                        12.7   7.34  )-----------( 154      ( 23 Aug 2023 09:15 )             37.6     08-23    131<L>  |  92<L>  |  36<H>  ----------------------------<  122<H>  4.4   |  27  |  1.25    Ca    9.2      23 Aug 2023 09:15    TPro  7.5  /  Alb  4.0  /  TBili  0.7  /  DBili  x   /  AST  21  /  ALT  9<L>  /  AlkPhos  75  08-23    PT/INR - ( 23 Aug 2023 09:15 )   PT: 10.6 sec;   INR: 0.93          PTT - ( 23 Aug 2023 09:15 )  PTT:30.0 sec  Urinalysis Basic - ( 23 Aug 2023 09:15 )    Color: x / Appearance: x / SG: x / pH: x  Gluc: 122 mg/dL / Ketone: x  / Bili: x / Urobili: x   Blood: x / Protein: x / Nitrite: x   Leuk Esterase: x / RBC: x / WBC x   Sq Epi: x / Non Sq Epi: x / Bacteria: x      CARDIAC MARKERS ( 23 Aug 2023 09:15 )  x     / x     / 73 U/L / x     / 2.2 ng/mL    RADIOLOGY & ADDITIONAL STUDIES:  No further imaging required on this hospital stay.

## 2023-08-23 NOTE — PROGRESS NOTE ADULT - SUBJECTIVE AND OBJECTIVE BOX
Interventional Cardiology PA Post PCI SDA Discharge Note      Patient without complaints. Ambulated and voided without difficulties    Ext: Right Radial: no hematoma, no bleeding, dressing; C/D/I         Right Groin: no hematoma, no bleeding, dressing; C/D/I      Pulses: intact RAD/DP/PT to baseline     A/P: 89F Cantonese-speaking, ambulates with walker and has HHA 5 days a week 6hrs a day with PMHx of HTN, pAFib (on Eliquis, last dose 8/20/23),  HFrEF (now with EF 30-35% vs 40% on 1/23/22), severe MR, Medtronic PPM (s/p gen change 2022 @ [facility]), bioAVR (2009, [place]), and recent admission at [facility] (08/08/23-08/11/23) for acute decompensated HFrEF and Afib RVR s/p failed DCCV, s/p Digoxin and Amiodarone initiation, presents to [facility] for L/RHC for further evaluation and how best to manage her severe MR (i.e. medical management vs. MitraClip consideration).     Patient now s/p RHC/LHC  ACCESS: R TR @ 3pm, RFV 5Fr s/p manual hold    ---RHC---  RA 7  RV 44/31  PA 45/12 (22)  PCWP 13  CO 6.16   CI 4.3  PaSat 77%    ---LHC---  s/p KIMBERLI x1       1. Follow-up with PMD/Cardiologist Dr Lopez in 72 hours.  2. Post procedure labs/EKG reviewed and stable.    3. Pt given instructions on importance of taking antiplatelet medication.    4. Stable for discharge as per attending Dr. Dr Rodriguez after bed rest, pt voids, wrist stable and 30 minutes of ambulation.  5. Prescriptions for Plavix and eliquis e-prescribed and submitted to patient's pharmacy.  6. Patient will continue amiodarone 200 mg QD, toprol 50 mg QD, torsemide 20 mg QD, lipitor 10 mg QD, eliquis 2.5 mg QD, entresto 49/51 mg BID, lorazepam 1 mg PRN anxiety.

## 2023-08-23 NOTE — CONSULT NOTE ADULT - ASSESSMENT
89F Cantonese-speaking, ambulates with walker and has HHA 5 days a week 6hrs a day with PMHx of HTN, pAFib (on Eliquis, last dose 8/20/23),  HFrEF (now with EF 30-35%), severe MR, Medtronic PPM, bioAVR (2009, [place]), and recent admission at [facility] (08/08/23-08/11/23) for acute decompensated HFrEF and Afib RVR s/p failed DCCV, s/p Digoxin and Amiodarone initiation, presents to [facility] for L/RHC for further evaluation and how best to manage her severe MR (i.e. medical management vs. MitraClip). S/p Cardiac cath at this time, and CTS consulted for surgical evaluation. Pt is not a surgical candidate but can be considered for MitraClip with structural heart.    Plan:  -pt is not a candidate for open heart surgery given age and co-morbities and is therefore too high risk  89F Cantonese-speaking, ambulates with walker and has HHA 5 days a week 6hrs a day with PMHx of HTN, pAFib (on Eliquis, last dose 8/20/23),  HFrEF (now with EF 30-35%), severe MR, Medtronic PPM, bioAVR (2009, [place]), and recent admission at [facility] (08/08/23-08/11/23) for acute decompensated HFrEF and Afib RVR s/p failed DCCV, s/p Digoxin and Amiodarone initiation, presents to [facility] for L/RHC for further evaluation and how best to manage her severe MR (i.e. medical management vs. MitraClip). S/p Cardiac cath at this time, and CTS consulted for surgical evaluation. Pt is not a surgical candidate but can be considered for MitraClip with structural heart.    Plan:  1. Severe Mitral Regurg  -pt is not a candidate for open heart surgery given age and comorbidities and is therefore too high risk   -pt should be evaluated by structural heart for possible MitraClip   -no further imaging or testing required at this time     2.AF  -stable  -resume eliquis once cleared by his primary team    3. HTN  -resume home medications on discharge     4. HFrEF  -stable this time, resume home medications on discharge     Case discussed with Dr. Mosher and primary team.

## 2023-08-24 ENCOUNTER — TRANSCRIPTION ENCOUNTER (OUTPATIENT)
Age: 88
End: 2023-08-24

## 2023-08-28 DIAGNOSIS — I34.0 NONRHEUMATIC MITRAL (VALVE) INSUFFICIENCY: ICD-10-CM

## 2023-08-28 DIAGNOSIS — R94.39 ABNORMAL RESULT OF OTHER CARDIOVASCULAR FUNCTION STUDY: ICD-10-CM

## 2023-08-28 DIAGNOSIS — I25.10 ATHEROSCLEROTIC HEART DISEASE OF NATIVE CORONARY ARTERY WITHOUT ANGINA PECTORIS: ICD-10-CM

## 2023-08-30 ENCOUNTER — TRANSCRIPTION ENCOUNTER (OUTPATIENT)
Age: 88
End: 2023-08-30

## 2023-09-06 ENCOUNTER — TRANSCRIPTION ENCOUNTER (OUTPATIENT)
Age: 88
End: 2023-09-06

## 2025-01-03 NOTE — ED ADULT NURSE NOTE - OBJECTIVE STATEMENT
TF on hold at this time. Continous avaps at this time. Daughter at bedside.  Problem: Patient Centered Care  Goal: Patient preferences are identified and integrated in the patient's plan of care  Description: Interventions:  - What would you like us to know as we care for you? Pt came from [facility] at [facility] & was hospitalized at [facility]. Per daughter, pt is not returning back to [place].  - Provide timely, complete, and accurate information to patient/family  - Incorporate patient and family knowledge, values, beliefs, and cultural backgrounds into the planning and delivery of care  - Encourage patient/family to participate in care and decision-making at the level they choose  - Honor patient and family perspectives and choices  1/3/2025 1652 by Opal Mallory RN  Outcome: Not Progressing  1/3/2025 1652 by Opal Mallory, RN  Outcome: Progressing     Problem: Patient/Family Goals  Goal: Patient/Family Long Term Goal  Description: Patient's Long Term Goal: discharge    Interventions:  -   - See additional Care Plan goals for specific interventions  1/3/2025 1652 by Opal Mallory, RN  Outcome: Not Progressing  1/3/2025 1652 by Opal Mallory, RN  Outcome: Progressing  Goal: Patient/Family Short Term Goal  Description: Patient's Short Term Goal: feel better     Interventions:   -  - See additional Care Plan goals for specific interventions  1/3/2025 1652 by Opal Mallory, RN  Outcome: Not Progressing  1/3/2025 1652 by Opal Mallory, RN  Outcome: Progressing     Problem: Safety Risk - Non-Violent Restraints  Goal: Patient will remain free from self-harm  Description: INTERVENTIONS:  - Apply the least restrictive restraint to prevent harm  - Notify patient and family of reasons restraints applied  - Assess for any contributing factors to confusion (electrolyte disturbances, delirium, medications)  - Discontinue any unnecessary medical devices as soon as possible  - Assess the patient's  physical comfort, circulation, skin condition, hydration, nutrition and elimination needs   - Reorient and redirection as needed  - Assess for the need to continue restraints  1/3/2025 1652 by Opal Mallory RN  Outcome: Not Progressing  1/3/2025 1652 by Opal Mallory RN  Outcome: Progressing     Problem: PAIN - ADULT  Goal: Verbalizes/displays adequate comfort level or patient's stated pain goal  Description: INTERVENTIONS:  - Encourage pt to monitor pain and request assistance  - Assess pain using appropriate pain scale  - Administer analgesics based on type and severity of pain and evaluate response  - Implement non-pharmacological measures as appropriate and evaluate response  - Consider cultural and social influences on pain and pain management  - Manage/alleviate anxiety  - Utilize distraction and/or relaxation techniques  - Monitor for opioid side effects  - Notify MD/LIP if interventions unsuccessful or patient reports new pain  - Anticipate increased pain with activity and pre-medicate as appropriate  1/3/2025 1652 by Opal Mallory RN  Outcome: Not Progressing  1/3/2025 1652 by Opal Mallory RN  Outcome: Progressing     Problem: RISK FOR INFECTION - ADULT  Goal: Absence of fever/infection during anticipated neutropenic period  Description: INTERVENTIONS  - Monitor WBC  - Administer growth factors as ordered  - Implement neutropenic guidelines  1/3/2025 1652 by Opal Mallory RN  Outcome: Not Progressing  1/3/2025 1652 by Opal Mallory RN  Outcome: Progressing     Problem: SAFETY ADULT - FALL  Goal: Free from fall injury  Description: INTERVENTIONS:  - Assess pt frequently for physical needs  - Identify cognitive and physical deficits and behaviors that affect risk of falls.  - [place] fall precautions as indicated by assessment.  - Educate pt/family on patient safety including physical limitations  - Instruct pt to call for assistance with activity based on assessment  - Modify  environment to reduce risk of injury  - Provide assistive devices as appropriate  - Consider OT/PT consult to assist with strengthening/mobility  - Encourage toileting schedule  1/3/2025 1652 by Opal Mallory RN  Outcome: Not Progressing  1/3/2025 1652 by Opal Mallory RN  Outcome: Progressing     Problem: DISCHARGE PLANNING  Goal: Discharge to home or other facility with appropriate resources  Description: INTERVENTIONS:  - Identify barriers to discharge w/pt and caregiver  - Include patient/family/discharge partner in discharge planning  - Arrange for needed discharge resources and transportation as appropriate  - Identify discharge learning needs (meds, wound care, etc)  - Arrange for interpreters to assist at discharge as needed  - Consider post-discharge preferences of patient/family/discharge partner  - Complete POLST form as appropriate  - Assess patient's ability to be responsible for managing their own health  - Refer to Case Management Department for coordinating discharge planning if the patient needs post-hospital services based on physician/LIP order or complex needs related to functional status, cognitive ability or social support system  1/3/2025 1652 by Opal Mallory RN  Outcome: Not Progressing  1/3/2025 1652 by pOal Mallory, RN  Outcome: Progressing     Problem: Altered Communication/Language Barrier  Goal: Patient/Family is able to understand and participate in their care  Description: Interventions:  - Assess communication ability and preferred communication style  - Implement communication aides and strategies  - Use visual cues when possible  - Listen attentively, be patient, do not interrupt  - Minimize distractions  - Allow time for understanding and response  - Establish method for patient to ask for assistance (call light)  - Provide an  as needed  - Communicate barriers and strategies to overcome with those who interact with patient  1/3/2025 1652 by Mohamud  ANJELICA Joseph  Outcome: Not Progressing  1/3/2025 1652 by Opal Mallory RN  Outcome: Progressing     Problem: RESPIRATORY - ADULT  Goal: Achieves optimal ventilation and oxygenation  Description: INTERVENTIONS:  - Assess for changes in respiratory status  - Assess for changes in mentation and behavior  - Position to facilitate oxygenation and minimize respiratory effort  - Oxygen supplementation based on oxygen saturation or ABGs  - Provide Smoking Cessation handout, if applicable  - Encourage broncho-pulmonary hygiene including cough, deep breathe, Incentive Spirometry  - Assess the need for suctioning and perform as needed  - Assess and instruct to report SOB or any respiratory difficulty  - Respiratory Therapy support as indicated  - Manage/alleviate anxiety  - Monitor for signs/symptoms of CO2 retention  1/3/2025 1652 by Opal Mallory RN  Outcome: Not Progressing  1/3/2025 1652 by Opal Mallory RN  Outcome: Progressing     Problem: NEUROLOGICAL - ADULT  Goal: Achieves stable or improved neurological status  Description: INTERVENTIONS  - Assess for and report changes in neurological status  - Initiate measures to prevent increased intracranial pressure  - Maintain blood pressure and fluid volume within ordered parameters to optimize cerebral perfusion and minimize risk of hemorrhage  - Monitor temperature, glucose, and sodium. Initiate appropriate interventions as ordered  1/3/2025 1652 by Opal Mallory, RN  Outcome: Not Progressing  1/3/2025 1652 by Opal Mallory, RN  Outcome: Progressing  Goal: Absence of seizures  Description: INTERVENTIONS  - Monitor for seizure activity  - Administer anti-seizure medications as ordered  - Monitor neurological status  1/3/2025 1652 by Opal Mallory, RN  Outcome: Not Progressing  1/3/2025 1652 by Opal Mallory, RN  Outcome: Progressing  Goal: Remains free of injury related to seizure activity  Description: INTERVENTIONS:  - Maintain airway, patient  safety  and administer oxygen as ordered  - Monitor patient for seizure activity, document and report duration and description of seizure to MD/LIP  - If seizure occurs, turn patient to side and suction secretions as needed  - Reorient patient post seizure  - Seizure pads on all 4 side rails  - Instruct patient/family to notify RN of any seizure activity  - Instruct patient/family to call for assistance with activity based on assessment  1/3/2025 1652 by Opal Mallory RN  Outcome: Not Progressing  1/3/2025 1652 by Opal Mallory RN  Outcome: Progressing  Goal: Achieves maximal functionality and self care  Description: INTERVENTIONS  - Monitor swallowing and airway patency with patient fatigue and changes in neurological status  - Encourage and assist patient to increase activity and self care with guidance from PT/OT  - Encourage visually impaired, hearing impaired and aphasic patients to use assistive/communication devices  1/3/2025 1652 by Opal Mallory RN  Outcome: Not Progressing  1/3/2025 1652 by Opal Mallory RN  Outcome: Progressing     Problem: Diabetes/Glucose Control  Goal: Glucose maintained within prescribed range  Description: INTERVENTIONS:  - Monitor Blood Glucose as ordered  - Assess for signs and symptoms of hyperglycemia and hypoglycemia  - Administer ordered medications to maintain glucose within target range  - Assess barriers to adequate nutritional intake and initiate nutrition consult as needed  - Instruct patient on self management of diabetes  1/3/2025 1652 by Opal Mallory RN  Outcome: Not Progressing  1/3/2025 1652 by Opal Mallory RN  Outcome: Progressing     Problem: Delirium  Goal: Minimize duration of delirium  Description: Interventions:  - Encourage use of hearing aids, eye glasses  - Promote highest level of mobility daily  - Provide frequent reorientation  - Promote wakefulness i.e. lights on, blinds open  - Promote sleep, encourage patient's normal rest  cycle i.e. lights off, TV off, minimize noise and interruptions  - Encourage family to assist in orientation and promotion of home routines  1/3/2025 1652 by Opal Mallory, RN  Outcome: Not Progressing  1/3/2025 1652 by Opal Mallory, RN  Outcome: Progressing      Pt Cantonese speaking, daughter states she can translate. Reports new dx of CHF. States bilateral leg swelling and arm swelling worsened. endorsing SOB. No CP/palpitations.

## 2025-04-30 NOTE — ED ADULT NURSE NOTE - PRIMARY CARE PROVIDER
Date: 04/30/25    Dear Mesfin Cano,   My name is Judith Platt. I am a registered nurse from outpatient care management that works with [facility] Outpatient Care Management Department.    I have not been able to reach you and would like to set a time that I can talk with you regarding Care Management.   The Care Management Program is a free, voluntary program that you may opt out of at any time. The program is offered by [facility] Outpatient Care Management Department. We provide resources and education to assist in managing chronic illness as well as assisting with social needs, if any exist. Please call me with any questions you may have. I look forward to speaking with you.  Sincerely,  Judith Platt RN, BSN  [phone]  Outpatient Care Manager                    Care Management Program  PROGRAM DESCRIPTION:   The Care Management Program is an evidenced based program designed to provide you with the tools you need to make healthcare decisions.   Services offered in the program include the following:    Disease management    When you should follow up with your provider versus going to the emergency room    Help to navigate when transitioning from one care setting to another    Identifying barriers and developing patient goals    Communicates with your care team    Coordinating your care    Self-management action plans    Medication review    Connecting to community resources as needed   You are eligible for this program because you have a chronic condition. This free service, offered to you by your primary care office, can help you learn skills to manage your condition which may help you live a higher quality life.    unknown